# Patient Record
Sex: MALE | Race: WHITE | Employment: OTHER | URBAN - METROPOLITAN AREA
[De-identification: names, ages, dates, MRNs, and addresses within clinical notes are randomized per-mention and may not be internally consistent; named-entity substitution may affect disease eponyms.]

---

## 2017-01-13 RX ORDER — SIMVASTATIN 20 MG/1
20 TABLET, FILM COATED ORAL
Qty: 90 TAB | Refills: 1 | Status: SHIPPED | OUTPATIENT
Start: 2017-01-13 | End: 2017-06-25 | Stop reason: SDUPTHER

## 2017-01-13 RX ORDER — LOSARTAN POTASSIUM AND HYDROCHLOROTHIAZIDE 12.5; 5 MG/1; MG/1
1 TABLET ORAL DAILY
Qty: 90 TAB | Refills: 1 | Status: SHIPPED | OUTPATIENT
Start: 2017-01-13 | End: 2017-06-25 | Stop reason: SDUPTHER

## 2017-04-06 ENCOUNTER — OFFICE VISIT (OUTPATIENT)
Dept: INTERNAL MEDICINE CLINIC | Age: 72
End: 2017-04-06

## 2017-04-06 VITALS
SYSTOLIC BLOOD PRESSURE: 132 MMHG | HEART RATE: 55 BPM | HEIGHT: 71 IN | BODY MASS INDEX: 36.82 KG/M2 | WEIGHT: 263 LBS | DIASTOLIC BLOOD PRESSURE: 79 MMHG | RESPIRATION RATE: 18 BRPM | TEMPERATURE: 97.6 F | OXYGEN SATURATION: 98 %

## 2017-04-06 DIAGNOSIS — Z00.00 MEDICARE ANNUAL WELLNESS VISIT, INITIAL: Primary | ICD-10-CM

## 2017-04-06 DIAGNOSIS — Z71.89 ADVANCED CARE PLANNING/COUNSELING DISCUSSION: ICD-10-CM

## 2017-04-06 DIAGNOSIS — Z13.39 SCREENING FOR ALCOHOLISM: ICD-10-CM

## 2017-04-06 DIAGNOSIS — Z87.898 HISTORY OF SNORING: ICD-10-CM

## 2017-04-06 DIAGNOSIS — Z13.31 SCREENING FOR DEPRESSION: ICD-10-CM

## 2017-04-06 DIAGNOSIS — M17.11 PRIMARY OSTEOARTHRITIS OF RIGHT KNEE: ICD-10-CM

## 2017-04-06 DIAGNOSIS — E66.01 MORBID OBESITY DUE TO EXCESS CALORIES (HCC): ICD-10-CM

## 2017-04-06 DIAGNOSIS — M54.50 CHRONIC RIGHT-SIDED LOW BACK PAIN WITHOUT SCIATICA: ICD-10-CM

## 2017-04-06 DIAGNOSIS — E78.00 PURE HYPERCHOLESTEROLEMIA: ICD-10-CM

## 2017-04-06 DIAGNOSIS — I10 ESSENTIAL HYPERTENSION: ICD-10-CM

## 2017-04-06 DIAGNOSIS — G89.29 CHRONIC RIGHT-SIDED LOW BACK PAIN WITHOUT SCIATICA: ICD-10-CM

## 2017-04-06 NOTE — ACP (ADVANCE CARE PLANNING)
NN discussed with patient about Advance Medical Directive & reviewed documentation with patient. Patient respectfully declined paperwork, but will consider for the future.

## 2017-04-06 NOTE — PATIENT INSTRUCTIONS
Medicare Part B Preventive Services Guidelines/Limitations Date last completed and Frequency Due Date   Cardiovascular Screening Blood Tests (every 5 years)  Total cholesterol, HDL, Triglycerides Order as a panel if possible Completed 10/2016    As recommended by your PCP As recommended by your PCP or Specialist   Colorectal Cancer Screening  -Fecal occult blood test (annual)  -Flexible sigmoidoscopy (5y)  -Screening colonoscopy (10y)  -Barium Enema Age 49-80; After age [de-identified] if history of abnormal results Completed 11/23/2015       As recommended by your PCP or Specialist     Counseling to Prevent Tobacco Use (up to 8 sessions per year)  - Counseling greater than 3 and up to 10 minutes  - Counseling greater than 10 minutes Patients must be asymptomatic of tobacco-related conditions to receive as preventive service N/A N/A   Diabetes Screening Tests (at least every 3 years, Medicare covers annually or at 6-month intervals for prediabetic patients)    Fasting blood sugar (FBS) or glucose tolerance test (GTT) Patient must be diagnosed with one of the following:  -Hypertension, Dyslipidemia, obesity, previous impaired FBS or GTT  Or any two of the following: overweight, FH of diabetes, age ? 72, history of gestational diabetes, birth of baby weighing more than 9 pounds Completed 10/2016    Recommended every 3 years for non-diabetics     As recommended by your PCP or Specialist     Glaucoma Screening (no USPSTF recommendation) Diabetes mellitus, family history, , age 48 or over,  American, age 72 or over Completed 2/2017    Recommended annually As recommended by your PCP or Specialist   Prostate Cancer Screening (annually up to age 76)  - Digital rectal exam (MASON)  - Prostate specific antigen (PSA) Annually (age 48 or over), MASON not paid separately when covered E/M service is provided on same date As recommended by your PCP or Specialist    Recommended annually to age 76 As recommended by your PCP or Specialist     Seasonal Influenza Vaccination (annually)  Completed 10/2016    Recommended Annually Completed for 2016 flu season. TDAP Vaccination  Td completed 2/2011    Recommended every 10 years As recommended by your PCP or Specialist   Zoster (Shingles) Vaccination Covered by Medicare Part D through the pharmacy- PCP provides prescription Completed 12/2012    Recommended once over age 48  Complete   Pneumococcal Vaccination (once after 72)  Pneumo 23- 10/2011  Recommended once over the age of 72    Prevnar 15- 10/2015 Recommended once over the age of 72 Complete        Complete   Ultrasound Screening for Abdominal Aortic Aneurysm (AAA) (once) Patient must be referred through IPPE and not have had a screening for abdominal aortic aneurysm before under Medicare. Limited to patients who meet one of the following criteria:  - Men who are 73-68 years old and have smoked more than 100 cigarettes in their lifetime.  -Anyone with a FH of AAA  -Anyone recommended for screening by USPSTF Not indicated unless recommended by PCP   Not indicated unless recommended by PCP     Family Practice Management 2011    Please bring a copy of your completed advance medical directive to the office so it may be added to your medical record. Thank you. If you have any questions or concerns please feel free to contact me at [phone]. It was a pleasure meeting you today and participating in your healthcare.   Sheyla Garcia RN

## 2017-04-06 NOTE — PROGRESS NOTES
Nurse Navigator Medicare Wellness Visit performed by REX Avalos    This is an Initial Tori Exam (AWV) (Performed 12 months after IPPE or effective date of Medicare Part B enrollment, Once in a lifetime)    I have reviewed the patient's medical history in detail and updated the computerized patient record. History     Past Medical History:   Diagnosis Date    Arthritis     Calculus of kidney     Cancer ([address]     BCC skin  . Dr. Tiffani Albarado History of shingles 2000    Hypercholesterolemia     Hypertension     Left knee injury     Spinal stenosis     lower back       Past Surgical History:   Procedure Laterality Date    HX ORTHOPAEDIC      left knee replacement JW/Dr.Mark Matt Redman     REFERRAL TO GENERAL SURGERY  1973    removal cyst on foot     Current Outpatient Prescriptions   Medication Sig Dispense Refill    losartan-hydroCHLOROthiazide (HYZAAR) 50-12.5 mg per tablet Take 1 Tab by mouth daily. 90 Tab 1    simvastatin (ZOCOR) 20 mg tablet Take 1 Tab by mouth nightly. 90 Tab 1    metoprolol succinate (TOPROL-XL) 100 mg tablet TAKE 1 TABLET DAILY 90 Tab 1    Cholecalciferol, Vitamin D3, (VITAMIN D3) 1,000 unit cap Take  by mouth.  aspirin delayed-release 81 mg tablet Take 1 Tab by mouth daily.  ketoconazole (NIZORAL) 2 % topical cream Apply  to affected area daily as needed.  co-enzyme Q-10 (CO Q-10) 100 mg capsule Take 100 mg by mouth daily.  GLUC/MELINA-MSM#1/VIT C/BRENDON/BOR (GLUCOSAMINE-CHOND-MSM COMPLEX PO) Take  by mouth.        Allergies   Allergen Reactions    Other Medication Unknown (comments)     Calamine lotion    Pcn [Penicillins] Unknown (comments)     Family History   Problem Relation Age of Onset   Yvette Uribe Cancer Mother      pancreatic    Coronary Artery Disease Father 48    Heart Disease Father      CAD    Cancer Brother      lung    Coronary Artery Disease Brother     Coronary Artery Disease Brother     Cancer Brother      prostate    Heart Disease Paternal Aunt     Heart Disease Paternal Uncle     Diabetes Paternal Grandmother     Hypertension Neg Hx      Social History   Substance Use Topics    Smoking status: Former Smoker     Packs/day: 1.00     Years: 5.00     Quit date: 1/1/1970    Smokeless tobacco: Former User     Quit date: 1/1/1990    Alcohol use 4.2 oz/week     7 Cans of beer per week      Comment: yes      Patient Active Problem List   Diagnosis Code    Kidney stones N20.0    Osteoarthritis, knee M17.10    Back pain M54.9    Obesity E66.9    Essential hypertension I10    Pure hypercholesterolemia E78.00    Advanced care planning/counseling discussion Z71.89    History of snoring Z87.898         Depression Risk Factor Screening:   Patient denies feelings of being down, depressed or hopeless at this time. Patient states that they have a strong support system within their family & friends. PHQ 2 / 9, over the last two weeks 4/6/2017   Little interest or pleasure in doing things Not at all   Feeling down, depressed or hopeless Not at all   Total Score PHQ 2 0     Alcohol Risk Factor Screening: On any occasion during the past 3 months, have you had more than 4 drinks containing alcohol? No    Do you average more than 14 drinks per week? No    Functional Ability and Level of Safety:     Hearing Loss   normal-to-mild    Activities of Daily Living   Self-care. Patient states that he lives with his wife in a private residence. Patient states independence in all ADLs & denies the use of assistive devices for ambulation. NN encouraged patient to continue and/ or introduce routine physical exercise into their daily routine as applicable & as recommended by PCP. Patient verbalized understanding & agreement to take this into consideration. Patient states that he attempts to go to the gym 3 times a week or he will work in his yard for exercise.  Patient shares that he has been experiencing right knee, right hip & right back pain which he will discuss in more detail with PCP today. Requires assistance with:   ADL Assessment 4/6/2017   Feeding yourself No Help Needed   Getting from bed to chair No Help Needed   Getting dressed No Help Needed   Bathing or showering No Help Needed   Walk across the room (includes cane/walker) No Help Needed   Using the telphone No Help Needed   Taking your medications No Help Needed   Preparing meals No Help Needed   Managing money (expenses/bills) No Help Needed   Moderately strenuous housework (laundry) No Help Needed   Shopping for personal items (toiletries/medicines) No Help Needed   Shopping for groceries No Help Needed   Driving No Help Needed   Climbing a flight of stairs No Help Needed   Getting to places beyond walking distances No Help Needed       Fall Risk   Patient reports one fall down his front steps at the house in the snow. Patient states that he fell & then rolled. Patient states that he was able to get himself up after the fall. Patient denies injury & denies a visit to the ER/MD at the time of the fall. Patient denies the use of assistive devices for ambulation. Patient denies any additional falls. Patient denies feeling dizzy, weak, lightheaded & states no loss of consciousness at the time of the fall. Patient verbalized fall prevention strategies. Fall Risk Assessment, last 12 mths 4/6/2017   Able to walk? Yes   Fall in past 12 months? Yes   Fall with injury? No   Number of falls in past 12 months 1   Fall Risk Score 1     Abuse Screen   Patient is not abused    Review of Systems   Medicare Wellness Visit    Physical Examination     No exam data present    Evaluation of Cognitive Function:  Mood/affect:  happy  Appearance: age appropriate and casually dressed  Family member/caregiver input: None present; however, patient reports a strong support system. No exam performed today, Medicare Wellness Visit.     Patient Care Team:  Otilia Villalpando MD as PCP - General (Internal Medicine)    Advice/Referrals/Counseling   Education and counseling provided:  End-of-Life planning (with patient's consent)  Pneumococcal Vaccine  Influenza Vaccine  Prostate cancer screening tests (PSA, covered annually)  Colorectal cancer screening tests  Screening for glaucoma  tdap & shingles vaccinations      Assessment/Plan   1. NN discussed with patient about Advance Medical Directive & reviewed documentation with patient. Patient respectfully declined paperwork, but will consider for the future. 2. Patient is up to date on the following immunizations: flu vaccine (admin 10/2016), td vaccine (admin 2/2011), shingles vaccine (admin 12/2012), pneumonia 23 vaccine (admin 10/2011) & prevnar 13 vaccine (admin 10/2015). Patient confirmed the aforementioned preventative immunization dates are correct. Patient's health maintenance immunization record has been updated & is current. 3. Patient states that he follows his PCP's recommendations for PSA screenings & Colonoscopy screenings (report on file from 11/23/2015 performed by Dr. Navneet Lomeli at [facility] with nor recommended follow-up as patient will age out before next screening colonoscopy is due). Patient confirmed the aforementioned preventative screening dates. 4. Patient was not wearing corrective lenses. Patient reports having a routine eye exam & glaucoma screening within the last year (2/2017)  performed by Dr. Dayna Hunt at [facility]. Patient admits that this was his first ever eye exam & his results were all within normal limits except for the beginnings of cataracts. [facility] faxed requesting a copy of patient's last eye exam with glaucoma screening with patient's verbal approval.     5. Please see fall risk screening section for additional information. Patient verbalized understanding of all information discussed. Patient was given the opportunity to ask questions.   Medication reconciliation completed by MA/ LPN and reviewed by PCP. Patient provided AVS which includes Medicare Wellness Preventative Screening Table.

## 2017-04-06 NOTE — PROGRESS NOTES
HISTORY OF PRESENT ILLNESS  Mary Wheat is a 66 y.o. male. HPI  Hypertension:  Mary Wheat is a 66 y.o. male with hypertension. without Chronic kidney disease stage    Medication change since last visit: No  The patient reports:  taking medications as instructed, no medication side effects noted, no chest pain on exertion, no dyspnea on exertion, no swelling of ankles, no orthostatic dizziness or lightheadedness, no palpitations. He does report subtle gradual increase in fatigue with exertion but very nonspecific symtoms. Lifestyle modification/social history: not attempting to follow a low fat, low cholesterol diet, generally follows a low sodium diet, exercises sporadically, nonsmoker    Lab Results   Component Value Date/Time    Sodium 140 10/05/2016 12:00 AM    Potassium 4.3 10/05/2016 12:00 AM    Chloride 97 10/05/2016 12:00 AM    CO2 27 10/05/2016 12:00 AM    Glucose 88 10/05/2016 12:00 AM    BUN 22 10/05/2016 12:00 AM    Creatinine 1.06 10/05/2016 12:00 AM    BUN/Creatinine ratio 21 10/05/2016 12:00 AM    GFR est AA 80 10/05/2016 12:00 AM    GFR est non-AA 69 10/05/2016 12:00 AM    Calcium 9.8 10/05/2016 12:00 AM         Hyperlipidemia:  Mary Wheat is following up on his dyslipidemia. Cardiovascular risks for him are: LDL goal is under 100  hypertension  obese.    Currently he takes Zocor (simvastatin) ,   Lab Results   Component Value Date/Time    Cholesterol, total 179 10/05/2016 12:00 AM    Cholesterol, total 166 10/21/2015 10:17 AM    Cholesterol, total 176 10/17/2014 09:40 AM    Cholesterol, total 160 10/28/2013 08:26 AM    Cholesterol, total 185 11/09/2012 12:26 PM    HDL Cholesterol 57 10/05/2016 12:00 AM    HDL Cholesterol 61 10/21/2015 10:17 AM    HDL Cholesterol 60 10/17/2014 09:40 AM    HDL Cholesterol 59 10/28/2013 08:26 AM    HDL Cholesterol 56 11/09/2012 12:26 PM    LDL, calculated 103 10/05/2016 12:00 AM    LDL, calculated 90 10/21/2015 10:17 AM    LDL, calculated 102 10/17/2014 09:40 AM    LDL, calculated 90 10/28/2013 08:26 AM    LDL, calculated 105 11/09/2012 12:26 PM    Triglyceride 96 10/05/2016 12:00 AM    Triglyceride 77 10/21/2015 10:17 AM    Triglyceride 70 10/17/2014 09:40 AM    Triglyceride 55 10/28/2013 08:26 AM    Triglyceride 119 11/09/2012 12:26 PM     Lab Results   Component Value Date/Time    ALT (SGPT) 25 10/28/2013 08:26 AM    AST (SGOT) 25 10/28/2013 08:26 AM    Alk. phosphatase 56 10/28/2013 08:26 AM    Bilirubin, total 0.6 10/28/2013 08:26 AM       Myalgias: no  Fatigue: no        osteoarthritis  - R knee - gradually increasing achiness. No swelling. Some R low back pain. [facility] survey given to pt to complete. Results as follows:  Snoring: YES  Tired: NO  Observed: NO  Pressure: YES  BMI >35: YES  Age >50: YES  Neck (>17, >16): YES  Gender Male: YES    Score:  6 / 8   (High risk if > 3)    ROS    Physical Exam   Constitutional: He appears well-developed and well-nourished. No distress. /79 (BP 1 Location: Left arm, BP Patient Position: Sitting)  Pulse (!) 55  Temp 97.6 °F (36.4 °C) (Oral)   Resp 18  Ht 5' 10.5\" (1.791 m)  Wt 263 lb (119.3 kg)  SpO2 98%  BMI 37.2 kg/m2Body mass index is 37.2 kg/(m^2). HENT:   Mouth/Throat: Oropharynx is clear and moist.   Neck: No JVD present. Carotid bruit is not present. Cardiovascular: Normal rate, regular rhythm, normal heart sounds and intact distal pulses. Pulmonary/Chest: Effort normal and breath sounds normal.   Musculoskeletal: He exhibits no edema. Right hip: He exhibits normal range of motion, no tenderness, no swelling and no crepitus. Left hip: Normal.        Right knee: He exhibits decreased range of motion. He exhibits no swelling, no effusion and no bony tenderness. No tenderness found. Left knee: Normal.        Lumbar back: He exhibits normal range of motion, no tenderness, no swelling and no pain. Neurological: He is alert. Skin: Skin is warm and dry. He is not diaphoretic. Nursing note and vitals reviewed. ASSESSMENT and PLAN  Chong Luque was seen today for annual wellness visit. Diagnoses and all orders for this visit:    Medicare annual wellness visit, initial  Vikas Vera received a complete medicare wellness assessment today by Ludwin Singh RN. I've reviewed her assessment note and all screening/preventative plans addressed. I also addressed all questions and concerns surrounding the assessment and plans with Vikas Vera. Chronic right-sided low back pain without sciatica - mild/ moderate. We discussed importance of wt loss to reduce stress on wt bearing joints    Primary osteoarthritis of right knee - as above. He is planning to see his ortho specialist for relook at his DJD knee and spine    Essential hypertension - Well controlled and stable. his medications were reviewed and refilled where necessary as noted below. Labs ordered as noted. Pure hypercholesterolemia - Well controlled and stable. his medications were reviewed and refilled where necessary as noted below. Labs ordered as noted. Morbid obesity due to excess calories ([address] -long discussion on wt loss strategies. The patient is advised to begin progressive daily aerobic exercise program, follow a low fat, low cholesterol diet and attempt to lose weight. Monitor wt daily. Small grazing meals throughout day rather than infrequent large meals. Recheck in 6 months. History of snoring  He's high risk for obstructive sleep apnea and we discussed testing for this. He will have his wife observe him sleeping, look for apneic episodes. We discussed risks of obstructive sleep apnea. He is reluctant to pursue yet. Follow-up Disposition:  Return in about 6 months (around 10/6/2017).

## 2017-04-06 NOTE — MR AVS SNAPSHOT
Visit Information Date & Time Provider Department Dept. Phone Encounter #  
 4/6/2017  9:15 AM Neelima George MD [facility] [id number] Follow-up Instructions Return in about 6 months (around 10/6/2017). Upcoming Health Maintenance Date Due  
 GLAUCOMA SCREENING Q2Y 4/23/2008 DTaP/Tdap/Td series (1 - Tdap) 2/2/2011 MEDICARE YEARLY EXAM 4/7/2018 COLONOSCOPY 11/23/2025 Allergies as of 4/6/2017  Review Complete On: 10/6/2016 By: Tyrell Schofield LPN Severity Noted Reaction Type Reactions Other Medication  10/18/2011    Unknown (comments) Calamine lotion Pcn [Penicillins]  10/18/2011    Unknown (comments) Current Immunizations  Reviewed on 4/6/2017 Name Date Influenza High Dose Vaccine PF 10/1/2016, 10/23/2015 Influenza Vaccine 11/10/2014, 10/1/2013 Influenza Vaccine (Whole Virus) 10/30/2012 Influenza Vaccine Whole 10/8/2011 Pneumococcal Conjugate (PCV-13) 10/23/2015 Pneumococcal Vaccine (Unspecified Type) 10/15/2011 TD Vaccine 2/1/2011 Zoster Vaccine, Live 12/13/2012 Reviewed by Neelima George MD on 4/6/2017 at  9:47 AM  
You Were Diagnosed With   
  
 Codes Comments Medicare annual wellness visit, initial    -  Primary ICD-10-CM: Z00.00 ICD-9-CM: V70.0 Chronic right-sided low back pain without sciatica     ICD-10-CM: M54.5, G89.29 ICD-9-CM: 724.2, 338.29 Primary osteoarthritis of right knee     ICD-10-CM: M17.11 ICD-9-CM: 715.16 Essential hypertension     ICD-10-CM: I10 
ICD-9-CM: 401.9 Pure hypercholesterolemia     ICD-10-CM: E78.00 ICD-9-CM: 272.0 Morbid obesity due to excess calories (HCC)     ICD-10-CM: E66.01 
ICD-9-CM: 278.01 Vitals BP Pulse Temp Resp Height(growth percentile) Weight(growth percentile)  132/79 (BP 1 Location: Left arm, BP Patient Position: Sitting) (!) 55 97.6 °F (36.4 °C) (Oral) 18 5' 10.5\" (1.791 m) 263 lb (119.3 kg) SpO2 BMI Smoking Status 98% 37.2 kg/m2 Former Smoker Vitals History BMI and BSA Data Body Mass Index Body Surface Area  
 37.2 kg/m 2 2.44 m 2 Preferred Pharmacy Pharmacy Name Phone [facility]ARMACY #[id number] - [place], [place] - [address] [address] [phone] Your Updated Medication List  
  
   
This list is accurate as of: 4/6/17 10:04 AM.  Always use your most recent med list.  
  
  
  
  
 aspirin delayed-release 81 mg tablet Take 1 Tab by mouth daily. CO Q-10 100 mg capsule Generic drug:  co-enzyme Q-10 Take 100 mg by mouth daily. GLUCOSAMINE-CHOND-MSM COMPLEX PO Take  by mouth.  
  
 ketoconazole 2 % topical cream  
Commonly known as:  NIZORAL Apply  to affected area daily as needed. losartan-hydroCHLOROthiazide 50-12.5 mg per tablet Commonly known as:  HYZAAR Take 1 Tab by mouth daily. metoprolol succinate 100 mg tablet Commonly known as:  TOPROL-XL  
TAKE 1 TABLET DAILY  
  
 simvastatin 20 mg tablet Commonly known as:  ZOCOR Take 1 Tab by mouth nightly. VITAMIN D3 1,000 unit Cap Generic drug:  cholecalciferol Take  by mouth. Follow-up Instructions Return in about 6 months (around 10/6/2017). Patient Instructions Medicare Part B Preventive Services Guidelines/Limitations Date last completed and Frequency Due Date Cardiovascular Screening Blood Tests (every 5 years) Total cholesterol, HDL, Triglycerides Order as a panel if possible Completed 10/2016 As recommended by your PCP As recommended by your PCP or Specialist  
Colorectal Cancer Screening 
-Fecal occult blood test (annual) -Flexible sigmoidoscopy (5y) 
-Screening colonoscopy (10y) -Barium Enema Age 49-80; After age [de-identified] if history of abnormal results Completed 11/23/2015   As recommended by your PCP or Specialist 
  
 Counseling to Prevent Tobacco Use (up to 8 sessions per year) - Counseling greater than 3 and up to 10 minutes - Counseling greater than 10 minutes Patients must be asymptomatic of tobacco-related conditions to receive as preventive service N/A N/A Diabetes Screening Tests (at least every 3 years, Medicare covers annually or at 6-month intervals for prediabetic patients) Fasting blood sugar (FBS) or glucose tolerance test (GTT) Patient must be diagnosed with one of the following: 
-Hypertension, Dyslipidemia, obesity, previous impaired FBS or GTT 
Or any two of the following: overweight, FH of diabetes, age ? 72, history of gestational diabetes, birth of baby weighing more than 9 pounds Completed 10/2016 Recommended every 3 years for non-diabetics As recommended by your PCP or Specialist 
  
Glaucoma Screening (no USPSTF recommendation) Diabetes mellitus, family history, , age 48 or over,  American, age 72 or over Completed 2/2017 Recommended annually As recommended by your PCP or Specialist  
Prostate Cancer Screening (annually up to age 76) - Digital rectal exam (MASON) - Prostate specific antigen (PSA) Annually (age 48 or over), MASON not paid separately when covered E/M service is provided on same date As recommended by your PCP or Specialist 
 
Recommended annually to age 76 As recommended by your PCP or Specialist 
  
Seasonal Influenza Vaccination (annually)  Completed 10/2016 Recommended Annually Completed for 2016 flu season. TDAP Vaccination  Td completed 2/2011 Recommended every 10 years As recommended by your PCP or Specialist  
Zoster (Shingles) Vaccination Covered by Medicare Part D through the pharmacy- PCP provides prescription Completed 12/2012 Recommended once over age 48  Complete Pneumococcal Vaccination (once after 65)  Pneumo 23- 10/2011 Recommended once over the age of 72 Prevnar 13- 10/2015 Recommended once over the age of 72 Complete Complete Ultrasound Screening for Abdominal Aortic Aneurysm (AAA) (once) Patient must be referred through IPPE and not have had a screening for abdominal aortic aneurysm before under Medicare. Limited to patients who meet one of the following criteria: 
- Men who are 73-68 years old and have smoked more than 100 cigarettes in their lifetime. 
-Anyone with a FH of AAA 
-Anyone recommended for screening by USPSTF Not indicated unless recommended by PCP Not indicated unless recommended by PCP Family Practice Management 2011 Please bring a copy of your completed advance medical directive to the office so it may be added to your medical record. Thank you. If you have any questions or concerns please feel free to contact me at [phone]. It was a pleasure meeting you today and participating in your healthcare. Emilia Mirza RN Introducing [facility]! Dear Josh Frazier: Thank you for requesting a [employer] account. Our records indicate that you already have an active [employer] account. You can access your account anytime at [email or web address]. [email or web address] Did you know that you can access your hospital and ER discharge instructions at any time in [employer]? You can also review all of your test results from your hospital stay or ER visit. Additional Information If you have questions, please visit the Frequently Asked Questions section of the [employer] website at [email or web address] Remember, [employer] is NOT to be used for urgent needs. For medical emergencies, dial 911. Now available from your iPhone and Android! Please provide this summary of care documentation to your next provider. Your primary care clinician is listed as Andreina Rodas. If you have any questions after today's visit, please call [phone].

## 2017-04-17 DIAGNOSIS — I10 ESSENTIAL HYPERTENSION: ICD-10-CM

## 2017-04-18 RX ORDER — METOPROLOL SUCCINATE 100 MG/1
TABLET, EXTENDED RELEASE ORAL
Qty: 90 TAB | Refills: 0 | Status: SHIPPED | OUTPATIENT
Start: 2017-04-18 | End: 2017-06-26 | Stop reason: SDUPTHER

## 2017-10-12 ENCOUNTER — HOSPITAL ENCOUNTER (OUTPATIENT)
Dept: LAB | Age: 72
Discharge: HOME | End: 2017-10-12
Payer: MEDICARE

## 2017-10-12 ENCOUNTER — OFFICE VISIT (OUTPATIENT)
Dept: INTERNAL MEDICINE CLINIC | Age: 72
End: 2017-10-12

## 2017-10-12 VITALS
HEART RATE: 57 BPM | TEMPERATURE: 98 F | SYSTOLIC BLOOD PRESSURE: 134 MMHG | RESPIRATION RATE: 18 BRPM | BODY MASS INDEX: 36.71 KG/M2 | OXYGEN SATURATION: 96 % | DIASTOLIC BLOOD PRESSURE: 88 MMHG | WEIGHT: 262.25 LBS | HEIGHT: 71 IN

## 2017-10-12 DIAGNOSIS — E78.00 PURE HYPERCHOLESTEROLEMIA: ICD-10-CM

## 2017-10-12 DIAGNOSIS — I10 ESSENTIAL HYPERTENSION: Primary | ICD-10-CM

## 2017-10-12 PROCEDURE — 36415 COLL VENOUS BLD VENIPUNCTURE: CPT

## 2017-10-12 PROCEDURE — 80048 BASIC METABOLIC PNL TOTAL CA: CPT

## 2017-10-12 PROCEDURE — 80061 LIPID PANEL: CPT

## 2017-10-12 NOTE — PROGRESS NOTES
HISTORY OF PRESENT ILLNESS  Karla Patino is a 74 y.o. male. HPI  Hypertension:  Karla Patino is a 74 y.o. male with hypertension. with Chronic kidney disease stage 2   Medication change since last visit: No  The patient reports:  taking medications as instructed, no medication side effects noted, patient does not perform home BP monitoring, no chest pain on exertion, no dyspnea on exertion, no swelling of ankles, no orthostatic dizziness or lightheadedness, no palpitations. Lifestyle modification/social history: generally follows a low fat low cholesterol diet, generally follows a low sodium diet, exercises sporadically, nonsmoker    Lab Results   Component Value Date/Time    Sodium 140 10/05/2016 12:00 AM    Potassium 4.3 10/05/2016 12:00 AM    Chloride 97 10/05/2016 12:00 AM    CO2 27 10/05/2016 12:00 AM    Glucose 88 10/05/2016 12:00 AM    BUN 22 10/05/2016 12:00 AM    Creatinine 1.06 10/05/2016 12:00 AM    BUN/Creatinine ratio 21 10/05/2016 12:00 AM    GFR est AA 80 10/05/2016 12:00 AM    GFR est non-AA 69 10/05/2016 12:00 AM    Calcium 9.8 10/05/2016 12:00 AM         Hyperlipidemia:  Karla Patino is following up on his dyslipidemia. Cardiovascular risks for him are: LDL goal is under 100  hypertension.    Currently he takes Zocor (simvastatin) ,   Lab Results   Component Value Date/Time    Cholesterol, total 179 10/05/2016 12:00 AM    Cholesterol, total 166 10/21/2015 10:17 AM    Cholesterol, total 176 10/17/2014 09:40 AM    Cholesterol, total 160 10/28/2013 08:26 AM    Cholesterol, total 185 11/09/2012 12:26 PM    HDL Cholesterol 57 10/05/2016 12:00 AM    HDL Cholesterol 61 10/21/2015 10:17 AM    HDL Cholesterol 60 10/17/2014 09:40 AM    HDL Cholesterol 59 10/28/2013 08:26 AM    HDL Cholesterol 56 11/09/2012 12:26 PM    LDL, calculated 103 10/05/2016 12:00 AM    LDL, calculated 90 10/21/2015 10:17 AM    LDL, calculated 102 10/17/2014 09:40 AM    LDL, calculated 90 10/28/2013 08:26 AM    LDL, calculated 105 11/09/2012 12:26 PM    Triglyceride 96 10/05/2016 12:00 AM    Triglyceride 77 10/21/2015 10:17 AM    Triglyceride 70 10/17/2014 09:40 AM    Triglyceride 55 10/28/2013 08:26 AM    Triglyceride 119 11/09/2012 12:26 PM     Lab Results   Component Value Date/Time    ALT (SGPT) 25 10/28/2013 08:26 AM    AST (SGOT) 25 10/28/2013 08:26 AM    Alk. phosphatase 56 10/28/2013 08:26 AM    Bilirubin, total 0.6 10/28/2013 08:26 AM       Myalgias: no  Fatigue: no          ROS    Physical Exam   Constitutional: He appears well-developed and well-nourished. No distress. /88 (BP 1 Location: Left arm, BP Patient Position: Sitting)  Pulse (!) 57  Temp 98 °F (36.7 °C) (Oral)   Resp 18  Ht 5' 10.5\" (1.791 m)  Wt 262 lb 4 oz (119 kg)  SpO2 96%  BMI 37.1 kg/m2Body mass index is 37.1 kg/(m^2). HENT:   Mouth/Throat: Oropharynx is clear and moist.   Neck: No JVD present. Carotid bruit is not present. Cardiovascular: Normal rate, regular rhythm, normal heart sounds and intact distal pulses. Pulmonary/Chest: Effort normal and breath sounds normal.   Musculoskeletal: He exhibits no edema. Neurological: He is alert. Skin: Skin is warm and dry. He is not diaphoretic. Nursing note and vitals reviewed. ASSESSMENT and PLAN  Diagnoses and all orders for this visit:    1. Essential hypertension - Well controlled and stable. his medications were reviewed and refilled where necessary as noted below. Labs ordered as noted. -     METABOLIC PANEL, BASIC    2. Pure hypercholesterolemia - Well controlled and stable. his medications were reviewed and refilled where necessary as noted below. Labs ordered as noted. -     LIPID PANEL      Follow-up Disposition:  Return in about 6 months (around 4/12/2018).

## 2017-10-13 LAB
BUN SERPL-MCNC: 19 MG/DL (ref 8–27)
BUN/CREAT SERPL: 19 (ref 10–24)
CALCIUM SERPL-MCNC: 9.3 MG/DL (ref 8.6–10.2)
CHLORIDE SERPL-SCNC: 100 MMOL/L (ref 96–106)
CHOLEST SERPL-MCNC: 191 MG/DL (ref 100–199)
CO2 SERPL-SCNC: 27 MMOL/L (ref 18–29)
CREAT SERPL-MCNC: 1.01 MG/DL (ref 0.76–1.27)
GLUCOSE SERPL-MCNC: 96 MG/DL (ref 65–99)
HDLC SERPL-MCNC: 63 MG/DL
INTERPRETATION, 910389: NORMAL
LDLC SERPL CALC-MCNC: 112 MG/DL (ref 0–99)
POTASSIUM SERPL-SCNC: 4.3 MMOL/L (ref 3.5–5.2)
SODIUM SERPL-SCNC: 141 MMOL/L (ref 134–144)
TRIGL SERPL-MCNC: 81 MG/DL (ref 0–149)
VLDLC SERPL CALC-MCNC: 16 MG/DL (ref 5–40)

## 2017-12-28 DIAGNOSIS — I10 ESSENTIAL HYPERTENSION: ICD-10-CM

## 2017-12-29 RX ORDER — LOSARTAN POTASSIUM AND HYDROCHLOROTHIAZIDE 12.5; 5 MG/1; MG/1
TABLET ORAL
Qty: 90 TAB | Refills: 1 | Status: SHIPPED | OUTPATIENT
Start: 2017-12-29 | End: 2018-06-07 | Stop reason: SDUPTHER

## 2017-12-29 RX ORDER — SIMVASTATIN 20 MG/1
TABLET, FILM COATED ORAL
Qty: 90 TAB | Refills: 1 | Status: SHIPPED | OUTPATIENT
Start: 2017-12-29 | End: 2018-06-07 | Stop reason: SDUPTHER

## 2017-12-29 RX ORDER — METOPROLOL SUCCINATE 100 MG/1
TABLET, EXTENDED RELEASE ORAL
Qty: 90 TAB | Refills: 1 | Status: SHIPPED | OUTPATIENT
Start: 2017-12-29 | End: 2018-06-07 | Stop reason: SDUPTHER

## 2018-01-31 ENCOUNTER — OFFICE VISIT (OUTPATIENT)
Dept: INTERNAL MEDICINE CLINIC | Age: 73
End: 2018-01-31

## 2018-01-31 VITALS
RESPIRATION RATE: 12 BRPM | BODY MASS INDEX: 35.56 KG/M2 | WEIGHT: 254 LBS | HEART RATE: 69 BPM | DIASTOLIC BLOOD PRESSURE: 83 MMHG | HEIGHT: 71 IN | TEMPERATURE: 98.4 F | OXYGEN SATURATION: 98 % | SYSTOLIC BLOOD PRESSURE: 130 MMHG

## 2018-01-31 DIAGNOSIS — J40 BRONCHITIS: Primary | ICD-10-CM

## 2018-01-31 RX ORDER — AZITHROMYCIN 250 MG/1
250 TABLET, FILM COATED ORAL SEE ADMIN INSTRUCTIONS
Qty: 6 TAB | Refills: 0 | Status: SHIPPED | OUTPATIENT
Start: 2018-01-31 | End: 2018-02-05

## 2018-01-31 RX ORDER — BENZONATATE 200 MG/1
200 CAPSULE ORAL
Qty: 30 CAP | Refills: 0 | Status: SHIPPED | OUTPATIENT
Start: 2018-01-31 | End: 2018-02-07

## 2018-01-31 NOTE — PATIENT INSTRUCTIONS
Bronchitis: Care Instructions  Your Care Instructions    Bronchitis is inflammation of the bronchial tubes, which carry air to the lungs. The tubes swell and produce mucus, or phlegm. The mucus and inflamed bronchial tubes make you cough. You may have trouble breathing. Most cases of bronchitis are caused by viruses like those that cause colds. Antibiotics usually do not help and they may be harmful. Bronchitis usually develops rapidly and lasts about 2 to 3 weeks in otherwise healthy people. Follow-up care is a key part of your treatment and safety. Be sure to make and go to all appointments, and call your doctor if you are having problems. It's also a good idea to know your test results and keep a list of the medicines you take. How can you care for yourself at home? · Take all medicines exactly as prescribed. Call your doctor if you think you are having a problem with your medicine. · Get some extra rest.  · Take an over-the-counter pain medicine, such as acetaminophen (Tylenol), ibuprofen (Advil, Motrin), or naproxen (Aleve) to reduce fever and relieve body aches. Read and follow all instructions on the label. · Do not take two or more pain medicines at the same time unless the doctor told you to. Many pain medicines have acetaminophen, which is Tylenol. Too much acetaminophen (Tylenol) can be harmful. · Take an over-the-counter cough medicine that contains dextromethorphan to help quiet a dry, hacking cough so that you can sleep. Avoid cough medicines that have more than one active ingredient. Read and follow all instructions on the label. · Breathe moist air from a humidifier, hot shower, or sink filled with hot water. The heat and moisture will thin mucus so you can cough it out. · Do not smoke. Smoking can make bronchitis worse. If you need help quitting, talk to your doctor about stop-smoking programs and medicines. These can increase your chances of quitting for good.   When should you call for help? Call 911 anytime you think you may need emergency care. For example, call if:  ? · You have severe trouble breathing. ?Call your doctor now or seek immediate medical care if:  ? · You have new or worse trouble breathing. ? · You cough up dark brown or bloody mucus (sputum). ? · You have a new or higher fever. ? · You have a new rash. ? Watch closely for changes in your health, and be sure to contact your doctor if:  ? · You cough more deeply or more often, especially if you notice more mucus or a change in the color of your mucus. ? · You are not getting better as expected. Where can you learn more? Go to http://elena-hugo.info/. Enter H333 in the search box to learn more about \"Bronchitis: Care Instructions. \"  Current as of: May 12, 2017  Content Version: 11.4  © 8204-2731 [employer]. Care instructions adapted under license by [employer] (which disclaims liability or warranty for this information). If you have questions about a medical condition or this instruction, always ask your healthcare professional. [address] any warranty or liability for your use of this information.

## 2018-01-31 NOTE — PROGRESS NOTES
HISTORY OF PRESENT ILLNESS  Kumar Landis is a 74 y.o. male. HPI  Upper respiratory illness:  Kumar Landis presents with complaints of congestion, post nasal drip, cough described as productive of thick yellow green sputum especially at night and clear nasal discharge for 10 days. Overall is feeling better but cough has been persisting and has remained productive. no nausea and no vomiting . he has not had  myalgias, fever and chills. Symptoms are moderate. Over-the-counter remedies including Mucinex DM   has been used with poor relief of symptoms. Drinking plenty of fluids: yes  Asthma?:  no  non-smoker  Contacts with similar infections: yes         Review of Systems   Constitutional: Positive for malaise/fatigue. Negative for chills and fever. HENT: Positive for congestion and sinus pain. Negative for sore throat. Respiratory: Positive for cough and sputum production. Negative for shortness of breath and wheezing. Cardiovascular: Negative for chest pain and palpitations. Musculoskeletal: Negative for myalgias. Neurological: Negative for dizziness and headaches. /83 (BP 1 Location: Left arm, BP Patient Position: Sitting)  Pulse 69  Temp 98.4 °F (36.9 °C) (Oral)   Resp 12  Ht 5' 10.5\" (1.791 m)  Wt 254 lb (115.2 kg)  SpO2 98%  BMI 35.93 kg/m2  Physical Exam   Constitutional: He is oriented to person, place, and time. He appears well-developed and well-nourished. HENT:   Head: Normocephalic and atraumatic. Right Ear: External ear normal.   Left Ear: External ear normal.   Nose: Nose normal.   Mouth/Throat: Oropharynx is clear and moist.   Neck: Normal range of motion. Neck supple. No thyromegaly present. Cardiovascular: Normal rate and regular rhythm. Pulmonary/Chest: Effort normal and breath sounds normal. He has no wheezes. Upper chest congestion   Musculoskeletal: Normal range of motion. Lymphadenopathy:     He has no cervical adenopathy.    Neurological: He is alert and oriented to person, place, and time. Psychiatric: He has a normal mood and affect. His behavior is normal.   Nursing note and vitals reviewed. ASSESSMENT and PLAN  Diagnoses and all orders for this visit:    1. Bronchitis -- has failed conservative measures  -     azithromycin (ZITHROMAX) 250 mg tablet; Take 1 Tab by mouth See Admin Instructions for 5 days. -     benzonatate (TESSALON) 200 mg capsule; Take 1 Cap by mouth three (3) times daily as needed for Cough for up to 7 days.       reviewed diet, exercise and weight control  reviewed medications and side effects in detail

## 2018-01-31 NOTE — MR AVS SNAPSHOT
[facility] 
 
 
 [address] 
[phone] Patient: Norah Castillo MRN: [id number] [id number] Visit Information Date & Time Provider Department Dept. Phone Encounter #  
 2018 10:00 AM Dave Cox NP [facility] [id number] Your Appointments 2018  8:45 AM  
ROUTINE CARE with Pablo Calderon MD  
[facility] Appt Note: 6 mo  
 [address] [facility] [id number]  
[phone]  
  
   
 [address] [place] [phone] Upcoming Health Maintenance Date Due DTaP/Tdap/Td series (1 - Tdap) 2011 Influenza Age 5 to Adult 2017 MEDICARE YEARLY EXAM 2018 GLAUCOMA SCREENING Q2Y 2019 COLONOSCOPY 2025 Allergies as of 2018  Review Complete On: 2018 By: Dave Cox NP Severity Noted Reaction Type Reactions Other Medication  10/18/2011    Unknown (comments) Calamine lotion Pcn [Penicillins]  10/18/2011    Unknown (comments) Current Immunizations  Reviewed on 10/12/2017 Name Date Influenza High Dose Vaccine PF 10/1/2016, 10/23/2015 Influenza Vaccine 11/10/2014, 10/1/2013 Influenza Vaccine (Whole Virus) 10/30/2012 Influenza Vaccine Whole 10/8/2011 Pneumococcal Conjugate (PCV-13) 10/23/2015 Pneumococcal Vaccine (Unspecified Type) 10/15/2011 TD Vaccine 2011 Zoster Vaccine, Live 2012 Not reviewed this visit You Were Diagnosed With   
  
 Codes Comments Bronchitis    -  Primary ICD-10-CM: E93 ICD-9-CM: 733 Vitals BP Pulse Temp Resp Height(growth percentile) Weight(growth percentile) 130/83 (BP 1 Location: Left arm, BP Patient Position: Sitting) 69 98.4 °F (36.9 °C) (Oral) 12 5' 10.5\" (1.791 m) 254 lb (115.2 kg) SpO2 BMI Smoking Status 98% 35.93 kg/m2 Former Smoker BMI and BSA Data Body Mass Index Body Surface Area  
 35.93 kg/m 2 2.39 m 2 Preferred Pharmacy Pharmacy Name Phone [facility]/PHARMACY #[id number] - [place], [place] - [address] [phone] Your Updated Medication List  
  
   
This list is accurate as of: 1/31/18 10:25 AM.  Always use your most recent med list.  
  
  
  
  
 aspirin delayed-release 81 mg tablet Take 1 Tab by mouth daily. azithromycin 250 mg tablet Commonly known as:  Wilene Pinna Take 1 Tab by mouth See Admin Instructions for 5 days. benzonatate 200 mg capsule Commonly known as:  TESSALON Take 1 Cap by mouth three (3) times daily as needed for Cough for up to 7 days. CO Q-10 100 mg capsule Generic drug:  co-enzyme Q-10 Take 100 mg by mouth daily. GLUCOSAMINE-CHOND-MSM COMPLEX PO Take  by mouth.  
  
 ketoconazole 2 % topical cream  
Commonly known as:  NIZORAL Apply  to affected area daily as needed. losartan-hydroCHLOROthiazide 50-12.5 mg per tablet Commonly known as:  HYZAAR  
TAKE 1 TABLET DAILY  
  
 metoprolol succinate 100 mg tablet Commonly known as:  TOPROL-XL  
TAKE 1 TABLET DAILY MUCINEX PO Take  by mouth. simvastatin 20 mg tablet Commonly known as:  ZOCOR  
TAKE 1 TABLET NIGHTLY  
  
 VITAMIN D3 1,000 unit Cap Generic drug:  cholecalciferol Take  by mouth. Prescriptions Sent to Pharmacy Refills  
 azithromycin (ZITHROMAX) 250 mg tablet 0 Sig: Take 1 Tab by mouth See Admin Instructions for 5 days. Class: Normal  
 Pharmacy: [facility]/pharmacy #[id number] - [place], [place] - [address] [address] AT [address] Ph #: [phone] Route: Oral  
 benzonatate (TESSALON) 200 mg capsule 0 Sig: Take 1 Cap by mouth three (3) times daily as needed for Cough for up to 7 days. Class: Normal  
 Pharmacy: [facility]/pharmacy #[id number] - [place], [place] - [address] [address]  AT [facility] #: [phone] Route: Oral  
  
Patient Instructions Bronchitis: Care Instructions Your Care Instructions Bronchitis is inflammation of the bronchial tubes, which carry air to the lungs. The tubes swell and produce mucus, or phlegm. The mucus and inflamed bronchial tubes make you cough. You may have trouble breathing. Most cases of bronchitis are caused by viruses like those that cause colds. Antibiotics usually do not help and they may be harmful. Bronchitis usually develops rapidly and lasts about 2 to 3 weeks in otherwise healthy people. Follow-up care is a key part of your treatment and safety. Be sure to make and go to all appointments, and call your doctor if you are having problems. It's also a good idea to know your test results and keep a list of the medicines you take. How can you care for yourself at home? · Take all medicines exactly as prescribed. Call your doctor if you think you are having a problem with your medicine. · Get some extra rest. 
· Take an over-the-counter pain medicine, such as acetaminophen (Tylenol), ibuprofen (Advil, Motrin), or naproxen (Aleve) to reduce fever and relieve body aches. Read and follow all instructions on the label. · Do not take two or more pain medicines at the same time unless the doctor told you to. Many pain medicines have acetaminophen, which is Tylenol. Too much acetaminophen (Tylenol) can be harmful. · Take an over-the-counter cough medicine that contains dextromethorphan to help quiet a dry, hacking cough so that you can sleep. Avoid cough medicines that have more than one active ingredient. Read and follow all instructions on the label. · Breathe moist air from a humidifier, hot shower, or sink filled with hot water. The heat and moisture will thin mucus so you can cough it out. · Do not smoke. Smoking can make bronchitis worse. If you need help quitting, talk to your doctor about stop-smoking programs and medicines. These can increase your chances of quitting for good. When should you call for help? Call 911 anytime you think you may need emergency care. For example, call if: 
? · You have severe trouble breathing. ?Call your doctor now or seek immediate medical care if: 
? · You have new or worse trouble breathing. ? · You cough up dark brown or bloody mucus (sputum). ? · You have a new or higher fever. ? · You have a new rash. ? Watch closely for changes in your health, and be sure to contact your doctor if: 
? · You cough more deeply or more often, especially if you notice more mucus or a change in the color of your mucus. ? · You are not getting better as expected. Where can you learn more? Go to http://elena-hugo.info/. Enter H333 in the search box to learn more about \"Bronchitis: Care Instructions. \" Current as of: May 12, 2017 Content Version: 11.4 © 5029-8106 [employer]. Care instructions adapted under license by [employer] (which disclaims liability or warranty for this information). If you have questions about a medical condition or this instruction, always ask your healthcare professional. [address] any warranty or liability for your use of this information. Introducing [facility]! Dear Kandi Kelly: Thank you for requesting a [employer] account. Our records indicate that you already have an active [employer] account. You can access your account anytime at [email or web address] Did you know that you can access your hospital and ER discharge instructions at any time in [employer]? You can also review all of your test results from your hospital stay or ER visit. Additional Information If you have questions, please visit the Frequently Asked Questions section of the [employer] website at [email or web address] Remember, [employer] is NOT to be used for urgent needs.  For medical emergencies, dial 911. Now available from your iPhone and Android! Please provide this summary of care documentation to your next provider. Your primary care clinician is listed as Suleiman Primrose. If you have any questions after today's visit, please call [phone].

## 2018-04-12 ENCOUNTER — OFFICE VISIT (OUTPATIENT)
Dept: INTERNAL MEDICINE CLINIC | Age: 73
End: 2018-04-12

## 2018-04-12 ENCOUNTER — HOSPITAL ENCOUNTER (OUTPATIENT)
Dept: LAB | Age: 73
Discharge: HOME | End: 2018-04-12
Payer: MEDICARE

## 2018-04-12 VITALS
HEIGHT: 70 IN | OXYGEN SATURATION: 97 % | RESPIRATION RATE: 18 BRPM | DIASTOLIC BLOOD PRESSURE: 76 MMHG | BODY MASS INDEX: 36.68 KG/M2 | HEART RATE: 61 BPM | SYSTOLIC BLOOD PRESSURE: 132 MMHG | WEIGHT: 256.25 LBS | TEMPERATURE: 97.6 F

## 2018-04-12 DIAGNOSIS — M17.11 PRIMARY OSTEOARTHRITIS OF RIGHT KNEE: ICD-10-CM

## 2018-04-12 DIAGNOSIS — Z71.89 ADVANCED CARE PLANNING/COUNSELING DISCUSSION: ICD-10-CM

## 2018-04-12 DIAGNOSIS — I10 ESSENTIAL HYPERTENSION: ICD-10-CM

## 2018-04-12 DIAGNOSIS — E78.00 PURE HYPERCHOLESTEROLEMIA: ICD-10-CM

## 2018-04-12 DIAGNOSIS — Z00.00 MEDICARE ANNUAL WELLNESS VISIT, SUBSEQUENT: Primary | ICD-10-CM

## 2018-04-12 DIAGNOSIS — E66.09 CLASS 2 OBESITY DUE TO EXCESS CALORIES WITHOUT SERIOUS COMORBIDITY WITH BODY MASS INDEX (BMI) OF 35.0 TO 35.9 IN ADULT: ICD-10-CM

## 2018-04-12 DIAGNOSIS — Z13.31 SCREENING FOR DEPRESSION: ICD-10-CM

## 2018-04-12 PROCEDURE — 80048 BASIC METABOLIC PNL TOTAL CA: CPT

## 2018-04-12 PROCEDURE — 36415 COLL VENOUS BLD VENIPUNCTURE: CPT

## 2018-04-12 RX ORDER — CETIRIZINE HCL 10 MG
TABLET ORAL
COMMUNITY

## 2018-04-12 NOTE — PROGRESS NOTES
HISTORY OF PRESENT ILLNESS  Ani Freeman is a 74 y.o. male. HPI  Hypertension:  Ani Freeman is a 74 y.o. male with hypertension. with Chronic kidney disease stage 2   Medication change since last visit: No  The patient reports:  taking medications as instructed, no medication side effects noted, not checking bps at home, no chest pain on exertion, no dyspnea on exertion, no swelling of ankles, no orthostatic dizziness or lightheadedness, no palpitations. Lifestyle modification/social history: not attempting to follow a low fat, low cholesterol diet, exercises regularly, nonsmoker    Lab Results   Component Value Date/Time    Sodium 141 10/12/2017 09:54 AM    Potassium 4.3 10/12/2017 09:54 AM    Chloride 100 10/12/2017 09:54 AM    CO2 27 10/12/2017 09:54 AM    Glucose 96 10/12/2017 09:54 AM    BUN 19 10/12/2017 09:54 AM    Creatinine 1.01 10/12/2017 09:54 AM    BUN/Creatinine ratio 19 10/12/2017 09:54 AM    GFR est AA 84 10/12/2017 09:54 AM    GFR est non-AA 73 10/12/2017 09:54 AM    Calcium 9.3 10/12/2017 09:54 AM     Hyperlipidemia:  Ani Freeman is following up on his dyslipidemia. Cardiovascular risks for him are: LDL goal is under 130  hypertension.    Currently he takes Zocor (simvastatin) ,   Lab Results   Component Value Date/Time    Cholesterol, total 191 10/12/2017 09:54 AM    Cholesterol, total 179 10/05/2016 12:00 AM    Cholesterol, total 166 10/21/2015 10:17 AM    Cholesterol, total 176 10/17/2014 09:40 AM    Cholesterol, total 160 10/28/2013 08:26 AM    HDL Cholesterol 63 10/12/2017 09:54 AM    HDL Cholesterol 57 10/05/2016 12:00 AM    HDL Cholesterol 61 10/21/2015 10:17 AM    HDL Cholesterol 60 10/17/2014 09:40 AM    HDL Cholesterol 59 10/28/2013 08:26 AM    LDL, calculated 112 (H) 10/12/2017 09:54 AM    LDL, calculated 103 (H) 10/05/2016 12:00 AM    LDL, calculated 90 10/21/2015 10:17 AM    LDL, calculated 102 (H) 10/17/2014 09:40 AM    LDL, calculated 90 10/28/2013 08:26 AM Triglyceride 81 10/12/2017 09:54 AM    Triglyceride 96 10/05/2016 12:00 AM    Triglyceride 77 10/21/2015 10:17 AM    Triglyceride 70 10/17/2014 09:40 AM    Triglyceride 55 10/28/2013 08:26 AM     Lab Results   Component Value Date/Time    ALT (SGPT) 25 10/28/2013 08:26 AM    AST (SGOT) 25 10/28/2013 08:26 AM    Alk. phosphatase 56 10/28/2013 08:26 AM    Bilirubin, total 0.6 10/28/2013 08:26 AM       Myalgias: no  Fatigue: no      Chronic osteoarthritis R knee, low back DDD. Followed by Dr. Roberto Pearson. He is not yet ready for any aggressive surgical interventions. Patient Active Problem List   Diagnosis Code    Kidney stones N20.0    Osteoarthritis, knee M17.10    Back pain M54.9    Obesity E66.9    Essential hypertension I10    Pure hypercholesterolemia E78.00    Advanced care planning/counseling discussion Z71.89    History of snoring Z87.898     Past Medical History:   Diagnosis Date    Arthritis     Calculus of kidney     Cancer ([facility] [address]     BCC skin  . Dr. Lesa Graf History of shingles 2000    Hypercholesterolemia     Hypertension     Left knee injury     Spinal stenosis     lower back      Allergies   Allergen Reactions    Other Medication Unknown (comments)     Calamine lotion    Pcn [Penicillins] Unknown (comments)     Current Outpatient Prescriptions on File Prior to Visit   Medication Sig Dispense Refill    losartan-hydroCHLOROthiazide (HYZAAR) 50-12.5 mg per tablet TAKE 1 TABLET DAILY 90 Tab 1    simvastatin (ZOCOR) 20 mg tablet TAKE 1 TABLET NIGHTLY 90 Tab 1    metoprolol succinate (TOPROL-XL) 100 mg tablet TAKE 1 TABLET DAILY 90 Tab 1    Cholecalciferol, Vitamin D3, (VITAMIN D3) 1,000 unit cap Take  by mouth.  aspirin delayed-release 81 mg tablet Take 1 Tab by mouth daily.  ketoconazole (NIZORAL) 2 % topical cream Apply  to affected area daily as needed.  co-enzyme Q-10 (CO Q-10) 100 mg capsule Take 100 mg by mouth daily.       GLUC/MELINA-MSM#1/VIT C/BRENDON/BOR (GLUCOSAMINE-CHOND-MSM COMPLEX PO) Take  by mouth. No current facility-administered medications on file prior to visit. Social History   Substance Use Topics    Smoking status: Former Smoker     Packs/day: 1.00     Years: 5.00     Quit date: 1/1/1970    Smokeless tobacco: Former User     Quit date: 1/1/1990    Alcohol use 4.2 oz/week     7 Cans of beer per week      Comment: yes              ROS    Physical Exam   Constitutional: He appears well-developed and well-nourished. No distress. /76  Pulse 61  Temp 97.6 °F (36.4 °C) (Oral)   Resp 18  Ht 5' 10\" (1.778 m)  Wt 256 lb 4 oz (116.2 kg)  SpO2 97%  BMI 36.77 kg/m2Body mass index is 36.77 kg/(m^2). HENT:   Mouth/Throat: Oropharynx is clear and moist.   Neck: No JVD present. Carotid bruit is not present. Cardiovascular: Normal rate, regular rhythm, normal heart sounds and intact distal pulses. Pulmonary/Chest: Effort normal and breath sounds normal.   Musculoskeletal: He exhibits no edema. Neurological: He is alert. Skin: Skin is warm and dry. He is not diaphoretic. Nursing note and vitals reviewed. ASSESSMENT and PLAN  Diagnoses and all orders for this visit:    1. Medicare annual wellness visit, subsequent  Nonah Hearing received a complete medicare wellness assessment today by Kaitlynn Mcdaniel RN. I've reviewed her assessment note and all screening/preventative plans addressed. I also addressed all questions and concerns surrounding the assessment and plans with Nonah Hearing. 2. Pure hypercholesterolemia - Well controlled and stable. his medications were reviewed and refilled where necessary as noted below. Labs ordered as noted. 3. Essential hypertension - Well controlled and stable. his medications were reviewed and refilled where necessary as noted below. Labs ordered as noted. -     METABOLIC PANEL, BASIC    4.  Class 2 obesity due to excess calories without serious comorbidity with body mass index (BMI) of 35.0 to 35.9 in adult  We discussed very low carb, portion control for 10-15lb wt loss goal.  Continue good aerobic exercise. 5. Primary osteoarthritis of right knee, and low back. Wt loss will help his pain most likely . See above. Follow up with Dr. Nadine Jose as planned. Follow-up Disposition:  Return in about 6 months (around 10/12/2018).

## 2018-04-12 NOTE — PROGRESS NOTES
Nurse Navigator Medicare Wellness Visit performed by REX Cordero RN    This is the Subsequent Medicare Annual Wellness Exam, performed 12 months or more after the Initial AWV or the last Subsequent AWV    I have reviewed the patient's medical history in detail and updated the computerized patient record. History     Past Medical History:   Diagnosis Date    Arthritis     Calculus of kidney     Cancer ([facility] [address]     BCC skin  . Dr. Elma Villarreal History of shingles 2000    Hypercholesterolemia     Hypertension     Left knee injury     Spinal stenosis     lower back       Past Surgical History:   Procedure Laterality Date    HX ORTHOPAEDIC      left knee replacement JW/Dr.Mark Beau Hunt     REFERRAL TO GENERAL SURGERY  1973    removal cyst on foot     Current Outpatient Prescriptions   Medication Sig Dispense Refill    cetirizine (ZYRTEC) 10 mg tablet Take  by mouth.  losartan-hydroCHLOROthiazide (HYZAAR) 50-12.5 mg per tablet TAKE 1 TABLET DAILY 90 Tab 1    simvastatin (ZOCOR) 20 mg tablet TAKE 1 TABLET NIGHTLY 90 Tab 1    metoprolol succinate (TOPROL-XL) 100 mg tablet TAKE 1 TABLET DAILY 90 Tab 1    Cholecalciferol, Vitamin D3, (VITAMIN D3) 1,000 unit cap Take  by mouth.  aspirin delayed-release 81 mg tablet Take 1 Tab by mouth daily.  ketoconazole (NIZORAL) 2 % topical cream Apply  to affected area daily as needed.  co-enzyme Q-10 (CO Q-10) 100 mg capsule Take 100 mg by mouth daily.  GLUC/MELINA-MSM#1/VIT C/BRENDON/BOR (GLUCOSAMINE-CHOND-MSM COMPLEX PO) Take  by mouth.        Allergies   Allergen Reactions    Other Medication Unknown (comments)     Calamine lotion    Pcn [Penicillins] Unknown (comments)     Family History   Problem Relation Age of Onset   Audi  Cancer Mother      pancreatic    Coronary Artery Disease Father 48    Heart Disease Father      CAD    Cancer Brother      lung    Coronary Artery Disease Brother     Coronary Artery Disease Brother     Cancer Brother prostate    Heart Disease Paternal Aunt     Heart Disease Paternal Uncle     Diabetes Paternal Grandmother     Hypertension Neg Hx      Social History   Substance Use Topics    Smoking status: Former Smoker     Packs/day: 1.00     Years: 5.00     Quit date: 1/1/1970    Smokeless tobacco: Former User     Quit date: 1/1/1990    Alcohol use 4.2 oz/week     7 Cans of beer per week      Comment: yes      Patient Active Problem List   Diagnosis Code    Kidney stones N20.0    Osteoarthritis, knee M17.10    Back pain M54.9    Obesity E66.9    Essential hypertension I10    Pure hypercholesterolemia E78.00    Advanced care planning/counseling discussion Z71.89    History of snoring Z87.898       Depression Risk Factor Screening:   Patient denies feelings of being down, depressed or hopeless at this time. Patient states that they have a strong support system within their family & friends. PHQ over the last two weeks 4/12/2018   Little interest or pleasure in doing things Not at all   Feeling down, depressed or hopeless Not at all   Total Score PHQ 2 0     Alcohol Risk Factor Screening: You do not drink alcohol or very rarely. Functional Ability and Level of Safety:   Hearing Loss  Hearing is good. Activities of Daily Living  The home contains: no safety equipment. Patient does total self care   Patient states that he lives with his wife in a private residence. Patient states independence in all ADLs & denies the use of assistive devices for ambulation. NN encouraged patient to continue and/ or introduce routine physical exercise into their daily routine as applicable & as recommended by PCP. Patient verbalized understanding & agreement to take this into consideration; however, patient states that he suffers from chronic right knee & lower back pain (he is followed by Dr. Sofi Yan) which can limit his physical activity when the pain flares.  Patient reports that he had a cortisone injection in his right knee yesterday & he has a back MRI scheduled as his orthopedist thinks patient may have a collapsed vertebrae. Patient states that he will discuss this in more detail with PCP today. Patient reports that he does go to the gym 3 times a week for exercise & he enjoys working in his yard. Patient shares that he & his wife enjoy going to basketball & football games together & they have season tickets for both sports. ADL Assessment 4/12/2018   Feeding yourself No Help Needed   Getting from bed to chair No Help Needed   Getting dressed No Help Needed   Bathing or showering No Help Needed   Walk across the room (includes cane/walker) No Help Needed   Using the telphone No Help Needed   Taking your medications No Help Needed   Preparing meals No Help Needed   Managing money (expenses/bills) No Help Needed   Moderately strenuous housework (laundry) No Help Needed   Shopping for personal items (toiletries/medicines) No Help Needed   Shopping for groceries No Help Needed   Driving No Help Needed   Climbing a flight of stairs No Help Needed   Getting to places beyond walking distances No Help Needed     Patient denies falls within the past year & verbalizes awareness of fall prevention strategies. Fall Risk  Fall Risk Assessment, last 12 mths 4/12/2018   Able to walk? Yes   Fall in past 12 months? No   Fall with injury? -   Number of falls in past 12 months -   Fall Risk Score -       Abuse Screen  Patient is not abused   Abuse Screening Questionnaire 4/12/2018   Do you ever feel afraid of your partner? N   Are you in a relationship with someone who physically or mentally threatens you? N   Is it safe for you to go home?  Y       Cognitive Screening   Evaluation of Cognitive Function:  Has your family/caregiver stated any concerns about your memory: no      Patient Care Team   Patient Care Team:  Kenneth Huizar MD as PCP - General (Internal Medicine)    Assessment/Plan   Education and counseling provided:  Are appropriate based on today's review and evaluation  End-of-Life planning (with patient's consent)  Pneumococcal Vaccine  Influenza Vaccine  Prostate cancer screening tests (PSA, covered annually)  Colorectal cancer screening tests  Screening for glaucoma  tdap & shingles vaccinations    Diagnoses and all orders for this visit:    1. Medicare annual wellness visit, subsequent    2. Pure hypercholesterolemia    3. Essential hypertension  -     METABOLIC PANEL, BASIC    4. Class 2 obesity due to excess calories without serious comorbidity with body mass index (BMI) of 35.0 to 35.9 in adult    5. Primary osteoarthritis of right knee    6. Advanced care planning/counseling discussion    AWV Summary:    1. Patient states conversation about Advanced Medical Directive has been started, but nothing written down yet. NN encouraged patient to complete Advanced Medical Directive paperwork & bring a copy to the office for scanning into the medical record. Patient verbalized understanding & agreement. 2. Patient is up to date on the following immunizations:  Immunization History   Administered Date(s) Administered    Influenza High Dose Vaccine PF 10/23/2015, 10/01/2016, 10/01/2017    Influenza Vaccine 10/01/2013, 11/10/2014    Influenza Vaccine (Whole Virus) 10/30/2012    Influenza Vaccine Whole 10/08/2011    Pneumococcal Conjugate (PCV-13) 10/23/2015    Pneumococcal Vaccine (Unspecified Type) 10/15/2011    TD Vaccine 02/01/2011    Zoster Vaccine, Live 12/13/2012   Patient confirmed the aforementioned preventative immunization dates are correct. Patient's health maintenance immunization record has been updated & is current. 3. Patient states that he follows his PCP's recommendations for PSA screenings & Colonoscopy screenings (report on file from 11/23/2015 performed by Dr. Sheryl Calloway at [facility] with no recommended follow-up screening needed due to patient's age).  Patient confirmed the aforementioned preventative screening dates. 4. Patient was not wearing corrective lenses. Patient does use reading glasses. Patient reports having a routine eye exam & glaucoma screening within the last year performed by [facility]. Oscar Gonzalez at [facility]. TOYA faxed requesting a copy of patient's last eye exam with glaucoma screening with patient's verbal approval. Patient states that his last eye exam/ glaucoma screening appointment was within the last two months. Patient verbalized understanding of all information discussed. Patient was given the opportunity to ask questions. Medication reconciliation completed by MA/ LPN and reviewed by PCP. Patient provided AVS, either a printed version or electronic version in [employer], which includes Medicare Wellness Preventative Screening Table.       Health Maintenance Due   Topic Date Due    DTaP/Tdap/Td series (1 - Tdap) 02/02/2011

## 2018-04-12 NOTE — PATIENT INSTRUCTIONS
Medicare Part B Preventive Services Guidelines/Limitations Date last completed and Frequency Due Date   Cardiovascular Screening Blood Tests (every 5 years)  Total cholesterol, HDL, Triglycerides Order as a panel if possible Completed 10/2017    As recommended by your PCP As recommended by your PCP or Specialist   Colorectal Cancer Screening  -Fecal occult blood test (annual)  -Flexible sigmoidoscopy (5y)  -Screening colonoscopy (10y)  -Barium Enema Age 49-80; After age [de-identified] if history of abnormal results Completed 11/23/2015     Recommended every 5 to 10 years  As recommended by your PCP or Specialist     Counseling to Prevent Tobacco Use (up to 8 sessions per year)  - Counseling greater than 3 and up to 10 minutes  - Counseling greater than 10 minutes Patients must be asymptomatic of tobacco-related conditions to receive as preventive service N/A N/A   Diabetes Screening Tests (at least every 3 years, Medicare covers annually or at 6-month intervals for prediabetic patients)    Fasting blood sugar (FBS) or glucose tolerance test (GTT) Patient must be diagnosed with one of the following:  -Hypertension, Dyslipidemia, obesity, previous impaired FBS or GTT  Or any two of the following: overweight, FH of diabetes, age ? 72, history of gestational diabetes, birth of baby weighing more than 9 pounds Completed 10/2017    Recommended every 3 years for non-diabetics     As recommended by your PCP or Specialist     Glaucoma Screening (no USPSTF recommendation) Diabetes mellitus, family history, , age 48 or over,  American, age 72 or over Completed within the last two months    Recommended annually As recommended by your PCP or Specialist   Prostate Cancer Screening (annually up to age 76)  - Digital rectal exam (MASON)  - Prostate specific antigen (PSA) Annually (age 48 or over), MASON not paid separately when covered E/M service is provided on same date As recommended by your PCP or Specialist    Recommended annually to age 76 As recommended by your PCP or Specialist     Seasonal Influenza Vaccination (annually)  Completed 10/2017    Recommended Annually Complete   TDAP Vaccination  Completed 2/2011    Recommended every 10 years As recommended by your PCP or Specialist   Zoster (Shingles) Vaccination Covered by Medicare Part D through the pharmacy- PCP provides prescription Completed 12/2012    Recommended once over age 48  Complete   Pneumococcal Vaccination (once after 72)  Pneumo 23- 10/2011  Recommended once over the age of 72    Prevnar 15- 10/2015 Recommended once over the age of 72 Complete        Complete   Ultrasound Screening for Abdominal Aortic Aneurysm (AAA) (once) Patient must be referred through IPPE and not have had a screening for abdominal aortic aneurysm before under Medicare. Limited to patients who meet one of the following criteria:  - Men who are 73-68 years old and have smoked more than 100 cigarettes in their lifetime.  -Anyone with a FH of AAA  -Anyone recommended for screening by USPSTF Not indicated unless recommended by PCP   Not indicated unless recommended by PCP     Family Practice Management 2011    Please bring a copy of your completed advance medical directive to the office so it may be added to your medical record. Thank you. If you have any questions or concerns please feel free to contact me at [phone]. It was a pleasure meeting you today and participating in your healthcare. Milly Willingham RN        Medicare Wellness Visit, Male    The best way to live healthy is to have a healthy lifestyle by eating a well-balanced diet, exercising regularly, limiting alcohol and stopping smoking. Regular physical exams and screening tests are another way to keep healthy. Preventive exams provided by your health care provider can find health problems before they become diseases or illnesses.  Preventive services including immunizations, screening tests, monitoring and exams can help you take care of your own health. All people over age 72 should have a pneumovax  and and a prevnar shot to prevent pneumonia. These are once in a lifetime unless you and your provider decide differently. All people over 65 should have a yearly flu shot and a tetanus vaccine every 10 years. Screening for diabetes mellitus with a blood sugar test should be done every year. Glaucoma is a disease of the eye due to increased ocular pressure that can lead to blindness and it should be done every year by an eye professional.    Cardiovascular screening tests that check for elevated lipids (fatty part of blood) which can lead to heart disease and strokes should be done every 5 years. Colorectal screening that evaluates for blood or polyps in your colon should be done yearly as a stool test or every five years as a flexible sigmoidoscope or every 10 years as a colonoscopy up to age 76. Men up to age 76 may need a screening blood test for prostate cancer at certain intervals, depending on their personal and family history. This decision is between the patient and his provider. If you have been a smoker or had family history of abdominal aortic aneurysms, you and your provider may decide to schedule an ultrasound test of your aorta. Hepatitis C screening is also recommended for anyone born between [birth year] through [address]. A shingles vaccine is also recommended once in a lifetime after age 61. Your Medicare Wellness Exam is recommended annually.     Here is a list of your current Health Maintenance items with a due date:  Health Maintenance Due   Topic Date Due    DTaP/Tdap/Td  (1 - Tdap) 02/02/2011

## 2018-04-12 NOTE — MR AVS SNAPSHOT
[facility] 
 
 
 [address] 
[phone] Patient: Fede Villanueva MRN: [id number] TGA:[birth year] Visit Information Date & Time Provider Department Dept. Phone Encounter #  
 4/12/2018  8:45 AM Rollo Cranker, MD [facility] [address] [facility] [id number] Follow-up Instructions Return in about 6 months (around 10/12/2018). Upcoming Health Maintenance Date Due DTaP/Tdap/Td series (1 - Tdap) 2/2/2011 MEDICARE YEARLY EXAM 4/7/2018 GLAUCOMA SCREENING Q2Y 2/23/2019 COLONOSCOPY 11/23/2025 Allergies as of 4/12/2018  Review Complete On: 4/12/2018 By: Casimiro Cates LPN Severity Noted Reaction Type Reactions Other Medication  10/18/2011    Unknown (comments) Calamine lotion Pcn [Penicillins]  10/18/2011    Unknown (comments) Current Immunizations  Reviewed on 10/12/2017 Name Date Influenza High Dose Vaccine PF 10/1/2017, 10/1/2016, 10/23/2015 Influenza Vaccine 11/10/2014, 10/1/2013 Influenza Vaccine (Whole Virus) 10/30/2012 Influenza Vaccine Whole 10/8/2011 Pneumococcal Conjugate (PCV-13) 10/23/2015 Pneumococcal Vaccine (Unspecified Type) 10/15/2011 TD Vaccine 2/1/2011 Zoster Vaccine, Live 12/13/2012 Not reviewed this visit You Were Diagnosed With   
  
 Codes Comments Medicare annual wellness visit, subsequent    -  Primary ICD-10-CM: Z00.00 ICD-9-CM: V70.0 Pure hypercholesterolemia     ICD-10-CM: E78.00 ICD-9-CM: 272.0 Essential hypertension     ICD-10-CM: I10 
ICD-9-CM: 401.9 Class 2 obesity due to excess calories without serious comorbidity with body mass index (BMI) of 35.0 to 35.9 in adult     ICD-10-CM: E66.09, Z68.35 ICD-9-CM: 278.00, V85.35 Primary osteoarthritis of right knee     ICD-10-CM: M17.11 ICD-9-CM: 715.16 Vitals BP Pulse Temp Resp Height(growth percentile) Weight(growth percentile) 132/76 61 97.6 °F (36.4 °C) (Oral) 18 5' 10\" (1.778 m) 256 lb 4 oz (116.2 kg) SpO2 BMI Smoking Status 97% 36.77 kg/m2 Former Smoker Vitals History BMI and BSA Data Body Mass Index Body Surface Area  
 36.77 kg/m 2 2.4 m 2 Preferred Pharmacy Pharmacy Name Phone [facility] [phone] Your Updated Medication List  
  
   
This list is accurate as of 4/12/18  9:18 AM.  Always use your most recent med list.  
  
  
  
  
 aspirin delayed-release 81 mg tablet Take 1 Tab by mouth daily. cetirizine 10 mg tablet Commonly known as:  ZYRTEC Take  by mouth. CO Q-10 100 mg capsule Generic drug:  co-enzyme Q-10 Take 100 mg by mouth daily. GLUCOSAMINE-CHOND-MSM COMPLEX PO Take  by mouth.  
  
 ketoconazole 2 % topical cream  
Commonly known as:  NIZORAL Apply  to affected area daily as needed. losartan-hydroCHLOROthiazide 50-12.5 mg per tablet Commonly known as:  HYZAAR  
TAKE 1 TABLET DAILY  
  
 metoprolol succinate 100 mg tablet Commonly known as:  TOPROL-XL  
TAKE 1 TABLET DAILY  
  
 simvastatin 20 mg tablet Commonly known as:  ZOCOR  
TAKE 1 TABLET NIGHTLY  
  
 VITAMIN D3 1,000 unit Cap Generic drug:  cholecalciferol Take  by mouth. Follow-up Instructions Return in about 6 months (around 10/12/2018). Introducing [facility] & HEALTH SERVICES! Dear Lyn Kelley: Thank you for requesting a VISEO account. Our records indicate that you already have an active VISEO account. You can access your account anytime at [email or web address] Did you know that you can access your hospital and ER discharge instructions at any time in VISEO? You can also review all of your test results from your hospital stay or ER visit. Additional Information If you have questions, please visit the Frequently Asked Questions section of the LiquidPiston website at [email or web address] Remember, LiquidPiston is NOT to be used for urgent needs. For medical emergencies, dial 911. Now available from your iPhone and Android! Please provide this summary of care documentation to your next provider. Your primary care clinician is listed as Dimitri Hernandez. If you have any questions after today's visit, please call [phone].

## 2018-04-13 LAB
BUN SERPL-MCNC: 18 MG/DL (ref 8–27)
BUN/CREAT SERPL: 17 (ref 10–24)
CALCIUM SERPL-MCNC: 9.1 MG/DL (ref 8.6–10.2)
CHLORIDE SERPL-SCNC: 99 MMOL/L (ref 96–106)
CO2 SERPL-SCNC: 28 MMOL/L (ref 18–29)
CREAT SERPL-MCNC: 1.03 MG/DL (ref 0.76–1.27)
GFR SERPLBLD CREATININE-BSD FMLA CKD-EPI: 71 ML/MIN/1.73
GFR SERPLBLD CREATININE-BSD FMLA CKD-EPI: 82 ML/MIN/1.73
GLUCOSE SERPL-MCNC: 89 MG/DL (ref 65–99)
POTASSIUM SERPL-SCNC: 4.3 MMOL/L (ref 3.5–5.2)
SODIUM SERPL-SCNC: 141 MMOL/L (ref 134–144)

## 2018-06-07 DIAGNOSIS — I10 ESSENTIAL HYPERTENSION: ICD-10-CM

## 2018-06-07 RX ORDER — METOPROLOL SUCCINATE 100 MG/1
100 TABLET, EXTENDED RELEASE ORAL DAILY
Qty: 90 TAB | Refills: 1 | Status: SHIPPED | OUTPATIENT
Start: 2018-06-07 | End: 2019-01-07 | Stop reason: SDUPTHER

## 2018-06-07 RX ORDER — LOSARTAN POTASSIUM AND HYDROCHLOROTHIAZIDE 12.5; 5 MG/1; MG/1
1 TABLET ORAL DAILY
Qty: 90 TAB | Refills: 1 | Status: SHIPPED | OUTPATIENT
Start: 2018-06-07 | End: 2019-01-07 | Stop reason: SDUPTHER

## 2018-06-07 RX ORDER — SIMVASTATIN 20 MG/1
20 TABLET, FILM COATED ORAL
Qty: 90 TAB | Refills: 1 | Status: SHIPPED | OUTPATIENT
Start: 2018-06-07 | End: 2019-01-07 | Stop reason: SDUPTHER

## 2018-10-11 ENCOUNTER — HOSPITAL ENCOUNTER (OUTPATIENT)
Dept: LAB | Age: 73
Discharge: HOME | End: 2018-10-11
Payer: MEDICARE

## 2018-10-11 ENCOUNTER — OFFICE VISIT (OUTPATIENT)
Dept: INTERNAL MEDICINE CLINIC | Age: 73
End: 2018-10-11

## 2018-10-11 VITALS
BODY MASS INDEX: 36.36 KG/M2 | OXYGEN SATURATION: 95 % | RESPIRATION RATE: 19 BRPM | DIASTOLIC BLOOD PRESSURE: 81 MMHG | SYSTOLIC BLOOD PRESSURE: 123 MMHG | HEIGHT: 70 IN | HEART RATE: 62 BPM | WEIGHT: 254 LBS | TEMPERATURE: 98.5 F

## 2018-10-11 DIAGNOSIS — E78.00 PURE HYPERCHOLESTEROLEMIA: ICD-10-CM

## 2018-10-11 DIAGNOSIS — I10 ESSENTIAL HYPERTENSION: Primary | ICD-10-CM

## 2018-10-11 DIAGNOSIS — E66.09 CLASS 2 OBESITY DUE TO EXCESS CALORIES WITHOUT SERIOUS COMORBIDITY WITH BODY MASS INDEX (BMI) OF 35.0 TO 35.9 IN ADULT: ICD-10-CM

## 2018-10-11 DIAGNOSIS — Z23 ENCOUNTER FOR IMMUNIZATION: ICD-10-CM

## 2018-10-11 PROBLEM — E66.01 SEVERE OBESITY (HCC): Status: RESOLVED | Noted: 2018-10-11 | Resolved: 2018-10-11

## 2018-10-11 PROBLEM — E66.01 SEVERE OBESITY (HCC): Status: ACTIVE | Noted: 2018-10-11

## 2018-10-11 PROCEDURE — 80061 LIPID PANEL: CPT

## 2018-10-11 PROCEDURE — 36415 COLL VENOUS BLD VENIPUNCTURE: CPT

## 2018-10-11 PROCEDURE — 80048 BASIC METABOLIC PNL TOTAL CA: CPT

## 2018-10-11 NOTE — PROGRESS NOTES
HISTORY OF PRESENT ILLNESS Marci Maldonado is a 74 y.o. male. HPI Hypertension: 
Marci Maldonado is a 74 y.o. male with hypertension. with Chronic kidney disease stage 2 Medication change since last visit: No 
The patient reports:  taking medications as instructed, no medication side effects noted, patient does not perform home BP monitoring, no chest pain on exertion, no dyspnea on exertion, no swelling of ankles, no orthostatic dizziness or lightheadedness, no palpitations. Lifestyle modification/social history: generally follows a low fat low cholesterol diet, generally follows a low sodium diet, exercises sporadically, nonsmoker Lab Results Component Value Date/Time Sodium 141 04/12/2018 10:06 AM  
 Potassium 4.3 04/12/2018 10:06 AM  
 Chloride 99 04/12/2018 10:06 AM  
 CO2 28 04/12/2018 10:06 AM  
 Glucose 89 04/12/2018 10:06 AM  
 BUN 18 04/12/2018 10:06 AM  
 Creatinine 1.03 04/12/2018 10:06 AM  
 BUN/Creatinine ratio 17 04/12/2018 10:06 AM  
 GFR est AA 82 04/12/2018 10:06 AM  
 GFR est non-AA 71 04/12/2018 10:06 AM  
 Calcium 9.1 04/12/2018 10:06 AM  
 
 
 
Hyperlipidemia: 
Marci Maldonado is following up on his dyslipidemia. Cardiovascular risks for him are: LDL goal is under 130 
hypertension. Currently he takes Zocor (simvastatin) , Lab Results Component Value Date/Time  Cholesterol, total 191 10/12/2017 09:54 AM  
 Cholesterol, total 179 10/05/2016 12:00 AM  
 Cholesterol, total 166 10/21/2015 10:17 AM  
 Cholesterol, total 176 10/17/2014 09:40 AM  
 Cholesterol, total 160 10/28/2013 08:26 AM  
 HDL Cholesterol 63 10/12/2017 09:54 AM  
 HDL Cholesterol 57 10/05/2016 12:00 AM  
 HDL Cholesterol 61 10/21/2015 10:17 AM  
 HDL Cholesterol 60 10/17/2014 09:40 AM  
 HDL Cholesterol 59 10/28/2013 08:26 AM  
 LDL, calculated 112 (H) 10/12/2017 09:54 AM  
 LDL, calculated 103 (H) 10/05/2016 12:00 AM  
 LDL, calculated 90 10/21/2015 10:17 AM  
 LDL, calculated 102 (H) 10/17/2014 09:40 AM  
 LDL, calculated 90 10/28/2013 08:26 AM  
 Triglyceride 81 10/12/2017 09:54 AM  
 Triglyceride 96 10/05/2016 12:00 AM  
 Triglyceride 77 10/21/2015 10:17 AM  
 Triglyceride 70 10/17/2014 09:40 AM  
 Triglyceride 55 10/28/2013 08:26 AM  
 
Lab Results Component Value Date/Time ALT (SGPT) 25 10/28/2013 08:26 AM  
 AST (SGOT) 25 10/28/2013 08:26 AM  
 Alk. phosphatase 56 10/28/2013 08:26 AM  
 Bilirubin, total 0.6 10/28/2013 08:26 AM  
 
 
Myalgias: no Fatigue: no 
 
 
 
osteoarthritis hip/ knee - seeing Sakshi Howe. Still having some R sided sciatica with stinging in R leg at times. Patient Active Problem List  
Diagnosis Code  Kidney stones N20.0  Osteoarthritis, knee M17.10  Back pain M54.9  Obesity E66.9  
 Essential hypertension I10  Pure hypercholesterolemia E78.00  Advanced care planning/counseling discussion Z70.80  
 History of snoring Z87.898 Past Medical History:  
Diagnosis Date  Arthritis  Calculus of kidney  Cancer ([facility] [address] BCC skin  . Dr. Khadra Mcgraw  History of shingles 2000  Hypercholesterolemia  Hypertension  Left knee injury  Spinal stenosis   
 lower back Allergies Allergen Reactions  Other Medication Unknown (comments) Calamine lotion  Pcn [Penicillins] Unknown (comments) Current Outpatient Prescriptions on File Prior to Visit Medication Sig Dispense Refill  metoprolol succinate (TOPROL-XL) 100 mg tablet Take 1 Tab by mouth daily. 90 Tab 1  
 losartan-hydroCHLOROthiazide (HYZAAR) 50-12.5 mg per tablet Take 1 Tab by mouth daily. 90 Tab 1  
 simvastatin (ZOCOR) 20 mg tablet Take 1 Tab by mouth nightly. 90 Tab 1  cetirizine (ZYRTEC) 10 mg tablet Take  by mouth.  Cholecalciferol, Vitamin D3, (VITAMIN D3) 1,000 unit cap Take  by mouth.  aspirin delayed-release 81 mg tablet Take 1 Tab by mouth daily.  ketoconazole (NIZORAL) 2 % topical cream Apply  to affected area daily as needed.  co-enzyme Q-10 (CO Q-10) 100 mg capsule Take 100 mg by mouth daily.  GLUC/MELINA-MSM#1/VIT C/BRENDON/BOR (GLUCOSAMINE-CHOND-MSM COMPLEX PO) Take  by mouth. No current facility-administered medications on file prior to visit. Social History Substance Use Topics  Smoking status: Former Smoker Packs/day: 1.00 Years: 5.00 Quit date: 1/1/1970  Smokeless tobacco: Former User Quit date: 1/1/1990  Alcohol use 4.2 oz/week 7 Cans of beer per week Comment: yes ROS Physical Exam  
Constitutional: He appears well-developed and well-nourished. No distress. /81 (BP 1 Location: Left arm, BP Patient Position: Sitting)  Pulse 62  Temp 98.5 °F (36.9 °C) (Oral)   Resp 19  Ht 5' 10\" (1.778 m)  Wt 254 lb (115.2 kg)  SpO2 95%  BMI 36.45 kg/m2Body mass index is 36.45 kg/(m^2). HENT:  
Mouth/Throat: Oropharynx is clear and moist.  
Neck: No JVD present. Carotid bruit is not present. Cardiovascular: Normal rate, regular rhythm, normal heart sounds and intact distal pulses. Pulmonary/Chest: Effort normal and breath sounds normal.  
Abdominal: Soft. Bowel sounds are normal.  
Musculoskeletal: He exhibits no edema. Neurological: He is alert. Skin: Skin is warm and dry. He is not diaphoretic. Nursing note and vitals reviewed. ASSESSMENT and PLAN Diagnoses and all orders for this visit: 1. Essential hypertension - Well controlled and stable. his medications were reviewed and refilled where necessary as noted below. Labs ordered as noted. -     METABOLIC PANEL, BASIC 2. Pure hypercholesterolemia - recheck -     LIPID PANEL 3. Class 2 obesity due to excess calories without serious comorbidity with body mass index (BMI) of 35.0 to 35.9 in adult The patient is advised to begin progressive daily aerobic exercise program, follow a low fat, low cholesterol diet and attempt to lose weight. 4. Encounter for immunization 
-     varicella-zoster recombinant, PF, (SHINGRIX, PF,) 50 mcg/0.5 mL susr injection; 0.5 mL by IntraMUSCular route once for 1 dose.  
 
 
Follow-up Disposition: 
Return in about 6 months (around 4/11/2019) for physical.

## 2018-10-11 NOTE — MR AVS SNAPSHOT
[facility] 
 
 
 [address] [place] [address] Harleen Schuster [address] [place] [address] Road 
[phone] Patient: Monique See MRN: [id number] RHM:0[birth year]12 Visit Information Date & Time Provider Department Dept. Phone Encounter #  
 10/11/2018  7:45 AM Lobo Alex MD [facility] [address]  [id number] Follow-up Instructions Return in about 6 months (around 4/11/2019) for physical.  
  
Upcoming Health Maintenance Date Due Shingrix Vaccine Age 50> (1 of 2) 4/23/1993 DTaP/Tdap/Td series (1 - Tdap) 2/2/2011 Influenza Age 5 to Adult 11/22/2018* MEDICARE YEARLY EXAM 4/13/2019 GLAUCOMA SCREENING Q2Y 2/21/2020 COLONOSCOPY 11/23/2025 *Topic was postponed. The date shown is not the original due date. Allergies as of 10/11/2018  Review Complete On: 10/11/2018 By: Hilary Owens LPN Severity Noted Reaction Type Reactions Other Medication  10/18/2011    Unknown (comments) Calamine lotion Pcn [Penicillins]  10/18/2011    Unknown (comments) Current Immunizations  Reviewed on 10/12/2017 Name Date Influenza High Dose Vaccine PF 10/1/2017, 10/1/2016, 10/23/2015 Influenza Vaccine 11/10/2014, 10/1/2013 Influenza Vaccine (Whole Virus) 10/30/2012 Influenza Vaccine Whole 10/8/2011 Pneumococcal Conjugate (PCV-13) 10/23/2015 Pneumococcal Vaccine (Unspecified Type) 10/15/2011 TD Vaccine 2/1/2011 Zoster Vaccine, Live 12/13/2012 Not reviewed this visit You Were Diagnosed With   
  
 Codes Comments Essential hypertension    -  Primary ICD-10-CM: I10 
ICD-9-CM: 401.9 Pure hypercholesterolemia     ICD-10-CM: E78.00 ICD-9-CM: 272.0 Class 2 obesity due to excess calories without serious comorbidity with body mass index (BMI) of 35.0 to 35.9 in adult     ICD-10-CM: E66.09, Z68.35 ICD-9-CM: 278.00, V85.35 Vitals BP Pulse Temp Resp Height(growth percentile) Weight(growth percentile) 123/81 (BP 1 Location: Left arm, BP Patient Position: Sitting) 62 98.5 °F (36.9 °C) (Oral) 19 5' 10\" (1.778 m) 254 lb (115.2 kg) SpO2 BMI Smoking Status 95% 36.45 kg/m2 Former Smoker Vitals History BMI and BSA Data Body Mass Index Body Surface Area  
 36.45 kg/m 2 2.39 m 2 Preferred Pharmacy Pharmacy Name Phone Janay Ku, [facility] [phone] Your Updated Medication List  
  
   
This list is accurate as of 10/11/18  8:03 AM.  Always use your most recent med list.  
  
  
  
  
 aspirin delayed-release 81 mg tablet Take 1 Tab by mouth daily. cetirizine 10 mg tablet Commonly known as:  ZYRTEC Take  by mouth. CO Q-10 100 mg capsule Generic drug:  co-enzyme Q-10 Take 100 mg by mouth daily. GLUCOSAMINE-CHOND-MSM COMPLEX PO Take  by mouth.  
  
 ketoconazole 2 % topical cream  
Commonly known as:  NIZORAL Apply  to affected area daily as needed. losartan-hydroCHLOROthiazide 50-12.5 mg per tablet Commonly known as:  HYZAAR Take 1 Tab by mouth daily. metoprolol succinate 100 mg tablet Commonly known as:  TOPROL-XL Take 1 Tab by mouth daily. simvastatin 20 mg tablet Commonly known as:  ZOCOR Take 1 Tab by mouth nightly. VITAMIN D3 1,000 unit Cap Generic drug:  cholecalciferol Take  by mouth. We Performed the Following LIPID PANEL [42629 CPT(R)] METABOLIC PANEL, BASIC [64694 CPT(R)] Follow-up Instructions Return in about 6 months (around 4/11/2019) for physical.  
  
  
Introducing [facility]! Dear Dank Obrien: Thank you for requesting a OkCopay account. Our records indicate that you already have an active OkCopay account. You can access your account anytime at [email or web address] Did you know that you can access your hospital and ER discharge instructions at any time in [employer]? You can also review all of your test results from your hospital stay or ER visit. Additional Information If you have questions, please visit the Frequently Asked Questions section of the [employer] website at [email or web address] Remember, [employer] is NOT to be used for urgent needs. For medical emergencies, dial 911. Now available from your iPhone and Android! Please provide this summary of care documentation to your next provider. Your primary care clinician is listed as Schuyler Cain. If you have any questions after today's visit, please call [phone].

## 2018-10-12 LAB
BUN SERPL-MCNC: 22 MG/DL (ref 8–27)
BUN/CREAT SERPL: 23 (ref 10–24)
CALCIUM SERPL-MCNC: 9.6 MG/DL (ref 8.6–10.2)
CHLORIDE SERPL-SCNC: 101 MMOL/L (ref 96–106)
CHOLEST SERPL-MCNC: 160 MG/DL (ref 100–199)
CO2 SERPL-SCNC: 27 MMOL/L (ref 20–29)
CREAT SERPL-MCNC: 0.95 MG/DL (ref 0.76–1.27)
GLUCOSE SERPL-MCNC: 94 MG/DL (ref 65–99)
HDLC SERPL-MCNC: 62 MG/DL
INTERPRETATION, 910389: NORMAL
LDLC SERPL CALC-MCNC: 85 MG/DL (ref 0–99)
POTASSIUM SERPL-SCNC: 4.1 MMOL/L (ref 3.5–5.2)
SODIUM SERPL-SCNC: 143 MMOL/L (ref 134–144)
TRIGL SERPL-MCNC: 67 MG/DL (ref 0–149)
VLDLC SERPL CALC-MCNC: 13 MG/DL (ref 5–40)

## 2019-01-07 DIAGNOSIS — I10 ESSENTIAL HYPERTENSION: ICD-10-CM

## 2019-01-07 RX ORDER — SIMVASTATIN 20 MG/1
TABLET, FILM COATED ORAL
Qty: 90 TAB | Refills: 1 | Status: SHIPPED | OUTPATIENT
Start: 2019-01-07

## 2019-01-07 RX ORDER — METOPROLOL SUCCINATE 100 MG/1
TABLET, EXTENDED RELEASE ORAL
Qty: 90 TAB | Refills: 1 | Status: SHIPPED | OUTPATIENT
Start: 2019-01-07

## 2019-01-07 RX ORDER — LOSARTAN POTASSIUM AND HYDROCHLOROTHIAZIDE 12.5; 5 MG/1; MG/1
TABLET ORAL
Qty: 90 TAB | Refills: 1 | Status: SHIPPED | OUTPATIENT
Start: 2019-01-07 | End: 2019-08-07 | Stop reason: SDUPTHER

## 2019-08-07 NOTE — TELEPHONE ENCOUNTER
Patient states he does have a new PCP Dr. Kriste Primrose, but states [facility] wont contact her office for the refill, states they must request through Dr Rudy Sharpe , states his apt with her is set for 09/05.        Patient can be reached at home [phone] , cell [phone] to confirm we can send in refill or not

## 2019-08-08 RX ORDER — LOSARTAN POTASSIUM AND HYDROCHLOROTHIAZIDE 12.5; 5 MG/1; MG/1
1 TABLET ORAL DAILY
Qty: 90 TAB | Refills: 1 | Status: SHIPPED | OUTPATIENT
Start: 2019-08-08

## 2022-03-19 PROBLEM — Z87.898 HISTORY OF SNORING: Status: ACTIVE | Noted: 2017-04-06

## 2022-03-19 PROBLEM — Z71.89 ADVANCED CARE PLANNING/COUNSELING DISCUSSION: Status: ACTIVE | Noted: 2017-04-06

## 2023-10-26 ENCOUNTER — HOSPITAL ENCOUNTER (EMERGENCY)
Facility: HOSPITAL | Age: 78
Discharge: HOME | End: 2023-10-26
Attending: EMERGENCY MEDICINE
Payer: MEDICARE

## 2023-10-26 ENCOUNTER — APPOINTMENT (OUTPATIENT)
Facility: HOSPITAL | Age: 78
End: 2023-10-26
Payer: MEDICARE

## 2023-10-26 VITALS
HEART RATE: 69 BPM | DIASTOLIC BLOOD PRESSURE: 70 MMHG | OXYGEN SATURATION: 94 % | TEMPERATURE: 97.8 F | RESPIRATION RATE: 13 BRPM | SYSTOLIC BLOOD PRESSURE: 127 MMHG

## 2023-10-26 DIAGNOSIS — R07.9 CHEST PAIN, UNSPECIFIED TYPE: Primary | ICD-10-CM

## 2023-10-26 DIAGNOSIS — R06.02 SHORTNESS OF BREATH: ICD-10-CM

## 2023-10-26 DIAGNOSIS — N28.9 RENAL INSUFFICIENCY: ICD-10-CM

## 2023-10-26 LAB
ANION GAP SERPL CALC-SCNC: 5 MMOL/L (ref 5–15)
APPEARANCE UR: CLEAR
BACTERIA URNS QL MICRO: NEGATIVE /HPF
BASOPHILS # BLD: 0 K/UL (ref 0–0.1)
BASOPHILS NFR BLD: 1 % (ref 0–1)
BILIRUB UR QL: NEGATIVE
BUN SERPL-MCNC: 31 MG/DL (ref 6–20)
BUN/CREAT SERPL: 21 (ref 12–20)
CALCIUM SERPL-MCNC: 9.4 MG/DL (ref 8.5–10.1)
CHLORIDE SERPL-SCNC: 98 MMOL/L (ref 97–108)
CO2 SERPL-SCNC: 29 MMOL/L (ref 21–32)
COLOR UR: NORMAL
COMMENT:: NORMAL
CREAT SERPL-MCNC: 1.5 MG/DL (ref 0.7–1.3)
DIFFERENTIAL METHOD BLD: ABNORMAL
EOSINOPHIL # BLD: 0 K/UL (ref 0–0.4)
EOSINOPHIL NFR BLD: 1 % (ref 0–7)
EPITH CASTS URNS QL MICRO: NORMAL /LPF
ERYTHROCYTE [DISTWIDTH] IN BLOOD BY AUTOMATED COUNT: 14.5 % (ref 11.5–14.5)
GLUCOSE SERPL-MCNC: 85 MG/DL (ref 65–100)
GLUCOSE UR STRIP.AUTO-MCNC: NEGATIVE MG/DL
HCT VFR BLD AUTO: 38.4 % (ref 36.6–50.3)
HGB BLD-MCNC: 13 G/DL (ref 12.1–17)
HGB UR QL STRIP: NEGATIVE
IMM GRANULOCYTES # BLD AUTO: 0 K/UL (ref 0–0.04)
IMM GRANULOCYTES NFR BLD AUTO: 0 % (ref 0–0.5)
KETONES UR QL STRIP.AUTO: NEGATIVE MG/DL
LEUKOCYTE ESTERASE UR QL STRIP.AUTO: NEGATIVE
LYMPHOCYTES # BLD: 1.5 K/UL (ref 0.8–3.5)
LYMPHOCYTES NFR BLD: 25 % (ref 12–49)
MCH RBC QN AUTO: 30.5 PG (ref 26–34)
MCHC RBC AUTO-ENTMCNC: 33.9 G/DL (ref 30–36.5)
MCV RBC AUTO: 90.1 FL (ref 80–99)
MONOCYTES # BLD: 0.5 K/UL (ref 0–1)
MONOCYTES NFR BLD: 9 % (ref 5–13)
NEUTS SEG # BLD: 3.7 K/UL (ref 1.8–8)
NEUTS SEG NFR BLD: 64 % (ref 32–75)
NITRITE UR QL STRIP.AUTO: NEGATIVE
NRBC # BLD: 0 K/UL (ref 0–0.01)
NRBC BLD-RTO: 0 PER 100 WBC
NT PRO BNP: 77 PG/ML (ref 0–450)
PH UR STRIP: 6.5 (ref 5–8)
PLATELET # BLD AUTO: 178 K/UL (ref 150–400)
PMV BLD AUTO: 8.4 FL (ref 8.9–12.9)
POTASSIUM SERPL-SCNC: 4.2 MMOL/L (ref 3.5–5.1)
PROT UR STRIP-MCNC: NEGATIVE MG/DL
RBC # BLD AUTO: 4.26 M/UL (ref 4.1–5.7)
RBC #/AREA URNS HPF: NORMAL /HPF (ref 0–5)
SODIUM SERPL-SCNC: 132 MMOL/L (ref 136–145)
SP GR UR REFRACTOMETRY: 1.02 (ref 1–1.03)
SPECIMEN HOLD: NORMAL
TROPONIN I SERPL HS-MCNC: 10 NG/L (ref 0–76)
TROPONIN I SERPL HS-MCNC: 10 NG/L (ref 0–76)
URINE CULTURE IF INDICATED: NORMAL
UROBILINOGEN UR QL STRIP.AUTO: 0.2 EU/DL (ref 0.2–1)
WBC # BLD AUTO: 5.8 K/UL (ref 4.1–11.1)
WBC URNS QL MICRO: NORMAL /HPF (ref 0–4)

## 2023-10-26 PROCEDURE — 99285 EMERGENCY DEPT VISIT HI MDM: CPT

## 2023-10-26 PROCEDURE — 93005 ELECTROCARDIOGRAM TRACING: CPT | Performed by: EMERGENCY MEDICINE

## 2023-10-26 PROCEDURE — 85025 COMPLETE CBC W/AUTO DIFF WBC: CPT

## 2023-10-26 PROCEDURE — 80048 BASIC METABOLIC PNL TOTAL CA: CPT

## 2023-10-26 PROCEDURE — 71045 X-RAY EXAM CHEST 1 VIEW: CPT

## 2023-10-26 PROCEDURE — 36415 COLL VENOUS BLD VENIPUNCTURE: CPT

## 2023-10-26 PROCEDURE — 81001 URINALYSIS AUTO W/SCOPE: CPT

## 2023-10-26 PROCEDURE — 83880 ASSAY OF NATRIURETIC PEPTIDE: CPT

## 2023-10-26 PROCEDURE — 84484 ASSAY OF TROPONIN QUANT: CPT

## 2023-10-26 PROCEDURE — 71275 CT ANGIOGRAPHY CHEST: CPT

## 2023-10-26 PROCEDURE — 6360000004 HC RX CONTRAST MEDICATION: Performed by: EMERGENCY MEDICINE

## 2023-10-26 RX ORDER — TRAZODONE HYDROCHLORIDE 150 MG/1
150 TABLET ORAL NIGHTLY
COMMUNITY

## 2023-10-26 RX ORDER — HYDROCHLOROTHIAZIDE 12.5 MG/1
12.5 TABLET ORAL DAILY
COMMUNITY

## 2023-10-26 RX ORDER — MIRTAZAPINE 15 MG/1
15 TABLET, ORALLY DISINTEGRATING ORAL NIGHTLY
COMMUNITY

## 2023-10-26 RX ORDER — QUETIAPINE FUMARATE 50 MG/1
50 TABLET, FILM COATED ORAL
COMMUNITY

## 2023-10-26 RX ORDER — QUETIAPINE FUMARATE 25 MG/1
25 TABLET, FILM COATED ORAL 2 TIMES DAILY
COMMUNITY

## 2023-10-26 RX ORDER — TAMSULOSIN HYDROCHLORIDE 0.4 MG/1
0.4 CAPSULE ORAL 2 TIMES DAILY
COMMUNITY

## 2023-10-26 RX ORDER — ASPIRIN 81 MG/1
81 TABLET ORAL DAILY
COMMUNITY

## 2023-10-26 RX ORDER — ACETAMINOPHEN 500 MG
500 TABLET ORAL EVERY 6 HOURS PRN
COMMUNITY

## 2023-10-26 RX ORDER — LANOLIN ALCOHOL/MO/W.PET/CERES
3 CREAM (GRAM) TOPICAL DAILY
COMMUNITY

## 2023-10-26 RX ORDER — VALSARTAN 80 MG/1
80 TABLET ORAL DAILY
COMMUNITY

## 2023-10-26 RX ADMIN — IOPAMIDOL 100 ML: 755 INJECTION, SOLUTION INTRAVENOUS at 13:17

## 2023-10-26 NOTE — ED TRIAGE NOTES
Patient presents to treatment area via EMS. Patient is resident of [facility] with history of dementia. Patient reportedly began experiencing chest pain about one hour ago. Patient is a poor historian, but cooperative. Denies pain at this time.

## 2023-10-26 NOTE — ED NOTES
Assisted patient with urinal. Resting on stretcher. Waiting for transport.      Michelle Dunham RN  10/26/23 1926

## 2023-10-26 NOTE — ED NOTES
Pt returned from CT. Visitors at bedside speaking with patient. No distress noted.       Daryn West RN  10/26/23 [phone]

## 2023-10-26 NOTE — ED NOTES
Pt discharged to living facility. Discharge instructions provided to patient's wife. All questions answered. IV removed. [facility] transport scheduled for 1815. Pt resting on stretcher without distress.       Zeynep Dowling RN  10/26/23 0452

## 2023-10-26 NOTE — ED NOTES
Report given to [facility] staff at time of transport. Pt assisted to stretcher. No distress noted at time of discharge.       Rashida Garcia RN  10/26/23 1958

## 2023-10-27 LAB
EKG ATRIAL RATE: 63 BPM
EKG DIAGNOSIS: NORMAL
EKG P AXIS: -13 DEGREES
EKG P-R INTERVAL: 270 MS
EKG Q-T INTERVAL: 416 MS
EKG QRS DURATION: 106 MS
EKG QTC CALCULATION (BAZETT): 425 MS
EKG R AXIS: -58 DEGREES
EKG T AXIS: 24 DEGREES
EKG VENTRICULAR RATE: 63 BPM

## 2023-10-27 PROCEDURE — 93010 ELECTROCARDIOGRAM REPORT: CPT | Performed by: SPECIALIST

## 2025-06-01 ENCOUNTER — HOSPITAL ENCOUNTER (EMERGENCY)
Facility: HOSPITAL | Age: 80
Discharge: HOME | End: 2025-06-01
Attending: EMERGENCY MEDICINE
Payer: MEDICARE

## 2025-06-01 VITALS
DIASTOLIC BLOOD PRESSURE: 71 MMHG | HEIGHT: 71 IN | OXYGEN SATURATION: 95 % | TEMPERATURE: 98.2 F | BODY MASS INDEX: 34.75 KG/M2 | RESPIRATION RATE: 18 BRPM | WEIGHT: 248.24 LBS | HEART RATE: 63 BPM | SYSTOLIC BLOOD PRESSURE: 146 MMHG

## 2025-06-01 DIAGNOSIS — Z04.9 CONDITION NOT FOUND: Primary | ICD-10-CM

## 2025-06-01 LAB
ALBUMIN SERPL-MCNC: 3.5 G/DL (ref 3.5–5)
ALBUMIN/GLOB SERPL: 1.1 (ref 1.1–2.2)
ALP SERPL-CCNC: 71 U/L (ref 45–117)
ALT SERPL-CCNC: 18 U/L (ref 12–78)
ANION GAP SERPL CALC-SCNC: 1 MMOL/L (ref 2–12)
APPEARANCE UR: CLEAR
AST SERPL-CCNC: 20 U/L (ref 15–37)
BACTERIA URNS QL MICRO: NEGATIVE /HPF
BASOPHILS # BLD: 0.04 K/UL (ref 0–0.1)
BASOPHILS NFR BLD: 0.8 % (ref 0–1)
BILIRUB SERPL-MCNC: 0.9 MG/DL (ref 0.2–1)
BILIRUB UR QL: NEGATIVE
BUN SERPL-MCNC: 23 MG/DL (ref 6–20)
BUN/CREAT SERPL: 17 (ref 12–20)
CALCIUM SERPL-MCNC: 9.1 MG/DL (ref 8.5–10.1)
CHLORIDE SERPL-SCNC: 104 MMOL/L (ref 97–108)
CO2 SERPL-SCNC: 31 MMOL/L (ref 21–32)
COLOR UR: NORMAL
CREAT SERPL-MCNC: 1.34 MG/DL (ref 0.7–1.3)
DIFFERENTIAL METHOD BLD: ABNORMAL
EOSINOPHIL # BLD: 0.09 K/UL (ref 0–0.4)
EOSINOPHIL NFR BLD: 1.9 % (ref 0–7)
EPITH CASTS URNS QL MICRO: NORMAL /LPF
ERYTHROCYTE [DISTWIDTH] IN BLOOD BY AUTOMATED COUNT: 13.2 % (ref 11.5–14.5)
GLOBULIN SER CALC-MCNC: 3.1 G/DL (ref 2–4)
GLUCOSE SERPL-MCNC: 88 MG/DL (ref 65–100)
GLUCOSE UR STRIP.AUTO-MCNC: NEGATIVE MG/DL
HCT VFR BLD AUTO: 38 % (ref 36.6–50.3)
HGB BLD-MCNC: 12.6 G/DL (ref 12.1–17)
HGB UR QL STRIP: NEGATIVE
HYALINE CASTS URNS QL MICRO: NORMAL /LPF (ref 0–2)
IMM GRANULOCYTES # BLD AUTO: 0.01 K/UL (ref 0–0.04)
IMM GRANULOCYTES NFR BLD AUTO: 0.2 % (ref 0–0.5)
KETONES UR QL STRIP.AUTO: NEGATIVE MG/DL
LEUKOCYTE ESTERASE UR QL STRIP.AUTO: NEGATIVE
LIPASE SERPL-CCNC: 29 U/L (ref 13–75)
LYMPHOCYTES # BLD: 1.58 K/UL (ref 0.8–3.5)
LYMPHOCYTES NFR BLD: 32.6 % (ref 12–49)
MCH RBC QN AUTO: 30.2 PG (ref 26–34)
MCHC RBC AUTO-ENTMCNC: 33.2 G/DL (ref 30–36.5)
MCV RBC AUTO: 91.1 FL (ref 80–99)
MONOCYTES # BLD: 0.56 K/UL (ref 0–1)
MONOCYTES NFR BLD: 11.5 % (ref 5–13)
NEUTS SEG # BLD: 2.57 K/UL (ref 1.8–8)
NEUTS SEG NFR BLD: 53 % (ref 32–75)
NITRITE UR QL STRIP.AUTO: NEGATIVE
NRBC # BLD: 0 K/UL (ref 0–0.01)
NRBC BLD-RTO: 0 PER 100 WBC
PH UR STRIP: 6 (ref 5–8)
PLATELET # BLD AUTO: 190 K/UL (ref 150–400)
PMV BLD AUTO: 8.4 FL (ref 8.9–12.9)
POTASSIUM SERPL-SCNC: 4.5 MMOL/L (ref 3.5–5.1)
PROT SERPL-MCNC: 6.6 G/DL (ref 6.4–8.2)
PROT UR STRIP-MCNC: NEGATIVE MG/DL
RBC # BLD AUTO: 4.17 M/UL (ref 4.1–5.7)
RBC #/AREA URNS HPF: NORMAL /HPF (ref 0–5)
SODIUM SERPL-SCNC: 136 MMOL/L (ref 136–145)
SP GR UR REFRACTOMETRY: 1.01 (ref 1–1.03)
TROPONIN I SERPL HS-MCNC: 8 NG/L (ref 0–76)
URINE CULTURE IF INDICATED: NORMAL
UROBILINOGEN UR QL STRIP.AUTO: 1 EU/DL (ref 0.2–1)
WBC # BLD AUTO: 4.9 K/UL (ref 4.1–11.1)
WBC URNS QL MICRO: NORMAL /HPF (ref 0–4)

## 2025-06-01 PROCEDURE — 84484 ASSAY OF TROPONIN QUANT: CPT

## 2025-06-01 PROCEDURE — 94761 N-INVAS EAR/PLS OXIMETRY MLT: CPT

## 2025-06-01 PROCEDURE — 83690 ASSAY OF LIPASE: CPT

## 2025-06-01 PROCEDURE — 81001 URINALYSIS AUTO W/SCOPE: CPT

## 2025-06-01 PROCEDURE — 99283 EMERGENCY DEPT VISIT LOW MDM: CPT

## 2025-06-01 PROCEDURE — 85025 COMPLETE CBC W/AUTO DIFF WBC: CPT

## 2025-06-01 PROCEDURE — 80053 COMPREHEN METABOLIC PANEL: CPT

## 2025-06-01 PROCEDURE — 36415 COLL VENOUS BLD VENIPUNCTURE: CPT

## 2025-06-01 NOTE — ED TRIAGE NOTES
Pt arrives to ED via EMS from [facility] with c/o L leg pain and groin pain    Per EMS, a&ox2 at baseline, alzheimer's, staff reports that he was favoring his R leg

## 2025-06-01 NOTE — ED PROVIDER NOTES
[facility] EMERGENCY DEPARTMENT  EMERGENCY DEPARTMENT ENCOUNTER      Pt Name: Rasta Betancourt  MRN: [id number]  Birthdate 1945  Date of evaluation: 6/1/2025  Provider: Freddy Lynne MD    CHIEF COMPLAINT       Chief Complaint   Patient presents with    Leg Pain         HISTORY OF PRESENT ILLNESS   (Location/Symptom, Timing/Onset, Context/Setting, Quality, Duration, Modifying Factors, Severity)  Note limiting factors.   Rasta Betancourt is a 80 y.o. male who presents to the emergency department      The history is provided by the patient. No  was used.       Nursing Notes were reviewed.    REVIEW OF SYSTEMS    (2-9 systems for level 4, 10 or more for level 5)     Review of Systems   Unable to perform ROS: Dementia       Except as noted above the remainder of the review of systems was reviewed and negative.       PAST MEDICAL HISTORY     Past Medical History:   Diagnosis Date    Alzheimer's dementia (HCC)     Anxiety     Arthritis     GERD (gastroesophageal reflux disease)     GI bleed     Hyperlipidemia     Hypertension     Schizophrenia (HCC)     Skin cancer          SURGICAL HISTORY     No past surgical history on file.      CURRENT MEDICATIONS       Previous Medications    ACETAMINOPHEN (TYLENOL) 500 MG TABLET    Take 1 tablet by mouth every 6 hours as needed for Pain    ASPIRIN 81 MG EC TABLET    Take 1 tablet by mouth daily    CARBIDOPA-LEVODOPA (SINEMET)  MG PER TABLET    Take 1 tablet by mouth 3 times daily    HYDROCHLOROTHIAZIDE (HYDRODIURIL) 12.5 MG TABLET    Take 1 tablet by mouth daily    MELATONIN 3 MG TABS TABLET    Take 1 tablet by mouth daily    MIRTAZAPINE (REMERON SOL-TAB) 15 MG DISINTEGRATING TABLET    Take 1 tablet by mouth nightly    QUETIAPINE (SEROQUEL) 25 MG TABLET    Take 1 tablet by mouth 2 times daily    QUETIAPINE (SEROQUEL) 50 MG TABLET    Take 1 tablet by mouth nightly    SERTRALINE (ZOLOFT) 50 MG TABLET    Take 1 tablet by mouth daily